# Patient Record
Sex: MALE | Race: BLACK OR AFRICAN AMERICAN | NOT HISPANIC OR LATINO | Employment: OTHER | URBAN - METROPOLITAN AREA
[De-identification: names, ages, dates, MRNs, and addresses within clinical notes are randomized per-mention and may not be internally consistent; named-entity substitution may affect disease eponyms.]

---

## 2017-01-03 ENCOUNTER — HOSPITAL ENCOUNTER (EMERGENCY)
Facility: OTHER | Age: 32
Discharge: HOME | End: 2017-01-03
Attending: EMERGENCY MEDICINE
Payer: MEDICAID

## 2017-01-03 VITALS
HEART RATE: 78 BPM | OXYGEN SATURATION: 99 % | TEMPERATURE: 98 F | DIASTOLIC BLOOD PRESSURE: 67 MMHG | HEIGHT: 65 IN | SYSTOLIC BLOOD PRESSURE: 140 MMHG | WEIGHT: 145 LBS | BODY MASS INDEX: 24.16 KG/M2 | RESPIRATION RATE: 20 BRPM

## 2017-01-03 DIAGNOSIS — R11.2 NAUSEA VOMITING AND DIARRHEA: ICD-10-CM

## 2017-01-03 DIAGNOSIS — R19.7 NAUSEA VOMITING AND DIARRHEA: ICD-10-CM

## 2017-01-03 DIAGNOSIS — K51.00 PANCOLITIS: Primary | ICD-10-CM

## 2017-01-03 LAB
ALBUMIN SERPL BCP-MCNC: 5.2 G/DL
ALP SERPL-CCNC: 101 U/L
ALT SERPL W/O P-5'-P-CCNC: 18 U/L
ANION GAP SERPL CALC-SCNC: 19 MMOL/L
AST SERPL-CCNC: 21 U/L
BASOPHILS # BLD AUTO: 0.03 K/UL
BASOPHILS NFR BLD: 0.2 %
BILIRUB SERPL-MCNC: 0.8 MG/DL
BUN SERPL-MCNC: 19 MG/DL
CALCIUM SERPL-MCNC: 11.1 MG/DL
CHLORIDE SERPL-SCNC: 109 MMOL/L
CO2 SERPL-SCNC: 17 MMOL/L
CREAT SERPL-MCNC: 1.4 MG/DL
DIFFERENTIAL METHOD: ABNORMAL
EOSINOPHIL # BLD AUTO: 0 K/UL
EOSINOPHIL NFR BLD: 0 %
ERYTHROCYTE [DISTWIDTH] IN BLOOD BY AUTOMATED COUNT: 12.7 %
EST. GFR  (AFRICAN AMERICAN): >60 ML/MIN/1.73 M^2
EST. GFR  (NON AFRICAN AMERICAN): >60 ML/MIN/1.73 M^2
GLUCOSE SERPL-MCNC: 130 MG/DL
HCT VFR BLD AUTO: 46.3 %
HGB BLD-MCNC: 16.7 G/DL
LIPASE SERPL-CCNC: 15 U/L
LYMPHOCYTES # BLD AUTO: 1.9 K/UL
LYMPHOCYTES NFR BLD: 10 %
MCH RBC QN AUTO: 33.2 PG
MCHC RBC AUTO-ENTMCNC: 36.1 %
MCV RBC AUTO: 92 FL
MONOCYTES # BLD AUTO: 1.2 K/UL
MONOCYTES NFR BLD: 6.3 %
NEUTROPHILS # BLD AUTO: 15.8 K/UL
NEUTROPHILS NFR BLD: 83.2 %
PLATELET # BLD AUTO: 283 K/UL
PMV BLD AUTO: 10.4 FL
POTASSIUM SERPL-SCNC: 3.9 MMOL/L
PROT SERPL-MCNC: 10.1 G/DL
RBC # BLD AUTO: 5.03 M/UL
SODIUM SERPL-SCNC: 145 MMOL/L
WBC # BLD AUTO: 18.96 K/UL

## 2017-01-03 PROCEDURE — 83690 ASSAY OF LIPASE: CPT

## 2017-01-03 PROCEDURE — 63600175 PHARM REV CODE 636 W HCPCS: Performed by: PHYSICIAN ASSISTANT

## 2017-01-03 PROCEDURE — 25500020 PHARM REV CODE 255: Performed by: EMERGENCY MEDICINE

## 2017-01-03 PROCEDURE — 85025 COMPLETE CBC W/AUTO DIFF WBC: CPT

## 2017-01-03 PROCEDURE — 96375 TX/PRO/DX INJ NEW DRUG ADDON: CPT

## 2017-01-03 PROCEDURE — 25000003 PHARM REV CODE 250: Performed by: PHYSICIAN ASSISTANT

## 2017-01-03 PROCEDURE — 96365 THER/PROPH/DIAG IV INF INIT: CPT

## 2017-01-03 PROCEDURE — 99284 EMERGENCY DEPT VISIT MOD MDM: CPT | Mod: 25

## 2017-01-03 PROCEDURE — 96376 TX/PRO/DX INJ SAME DRUG ADON: CPT

## 2017-01-03 PROCEDURE — 96361 HYDRATE IV INFUSION ADD-ON: CPT

## 2017-01-03 PROCEDURE — 80053 COMPREHEN METABOLIC PANEL: CPT

## 2017-01-03 RX ORDER — HYDROMORPHONE HYDROCHLORIDE 1 MG/ML
0.25 INJECTION, SOLUTION INTRAMUSCULAR; INTRAVENOUS; SUBCUTANEOUS
Status: COMPLETED | OUTPATIENT
Start: 2017-01-03 | End: 2017-01-03

## 2017-01-03 RX ORDER — HYDROMORPHONE HYDROCHLORIDE 1 MG/ML
1 INJECTION, SOLUTION INTRAMUSCULAR; INTRAVENOUS; SUBCUTANEOUS
Status: COMPLETED | OUTPATIENT
Start: 2017-01-03 | End: 2017-01-03

## 2017-01-03 RX ORDER — SODIUM CHLORIDE 9 MG/ML
1000 INJECTION, SOLUTION INTRAVENOUS
Status: COMPLETED | OUTPATIENT
Start: 2017-01-03 | End: 2017-01-03

## 2017-01-03 RX ORDER — MORPHINE SULFATE 2 MG/ML
4 INJECTION, SOLUTION INTRAMUSCULAR; INTRAVENOUS
Status: DISCONTINUED | OUTPATIENT
Start: 2017-01-03 | End: 2017-01-03

## 2017-01-03 RX ORDER — OXYCODONE AND ACETAMINOPHEN 5; 325 MG/1; MG/1
1 TABLET ORAL EVERY 4 HOURS PRN
Qty: 10 TABLET | Refills: 0 | Status: SHIPPED | OUTPATIENT
Start: 2017-01-03 | End: 2017-01-26

## 2017-01-03 RX ORDER — PROMETHAZINE HYDROCHLORIDE 25 MG/1
25 TABLET ORAL EVERY 4 HOURS
Qty: 20 TABLET | Refills: 0 | Status: SHIPPED | OUTPATIENT
Start: 2017-01-03 | End: 2017-01-26

## 2017-01-03 RX ORDER — FAMOTIDINE 10 MG/ML
20 INJECTION INTRAVENOUS
Status: COMPLETED | OUTPATIENT
Start: 2017-01-03 | End: 2017-01-03

## 2017-01-03 RX ORDER — ONDANSETRON 2 MG/ML
4 INJECTION INTRAMUSCULAR; INTRAVENOUS
Status: COMPLETED | OUTPATIENT
Start: 2017-01-03 | End: 2017-01-03

## 2017-01-03 RX ORDER — ONDANSETRON 4 MG/1
4 TABLET, ORALLY DISINTEGRATING ORAL EVERY 8 HOURS PRN
Qty: 12 TABLET | Refills: 0 | Status: SHIPPED | OUTPATIENT
Start: 2017-01-03

## 2017-01-03 RX ADMIN — HYDROMORPHONE HYDROCHLORIDE 1 MG: 1 INJECTION, SOLUTION INTRAMUSCULAR; INTRAVENOUS; SUBCUTANEOUS at 07:01

## 2017-01-03 RX ADMIN — ONDANSETRON HYDROCHLORIDE 4 MG: 2 SOLUTION INTRAMUSCULAR; INTRAVENOUS at 06:01

## 2017-01-03 RX ADMIN — PROMETHAZINE HYDROCHLORIDE 12.5 MG: 25 INJECTION INTRAMUSCULAR; INTRAVENOUS at 08:01

## 2017-01-03 RX ADMIN — FAMOTIDINE 20 MG: 10 INJECTION, SOLUTION INTRAVENOUS at 06:01

## 2017-01-03 RX ADMIN — SODIUM CHLORIDE 1000 ML: 0.9 INJECTION, SOLUTION INTRAVENOUS at 08:01

## 2017-01-03 RX ADMIN — HYDROMORPHONE HYDROCHLORIDE 0.25 MG: 1 INJECTION, SOLUTION INTRAMUSCULAR; INTRAVENOUS; SUBCUTANEOUS at 09:01

## 2017-01-03 RX ADMIN — SODIUM CHLORIDE 1000 ML: 0.9 INJECTION, SOLUTION INTRAVENOUS at 06:01

## 2017-01-03 RX ADMIN — HYDROMORPHONE HYDROCHLORIDE 1 MG: 1 INJECTION, SOLUTION INTRAMUSCULAR; INTRAVENOUS; SUBCUTANEOUS at 08:01

## 2017-01-03 RX ADMIN — IOHEXOL 75 ML: 350 INJECTION, SOLUTION INTRAVENOUS at 07:01

## 2017-01-03 NOTE — ED PROVIDER NOTES
Encounter Date: 1/3/2017       History     Chief Complaint   Patient presents with    Emesis     pt c/o n/v/d that started sunday, pt is unable to keep anything down. pt also c/o abd pain. reports chronic h/o gastritis.      Review of patient's allergies indicates:  No Known Allergies  HPI Comments: Patient is 31-year-old male history of gastritis and acute kidney injury or presents with complaints of generalized abdominal pain, nausea, vomiting, diarrhea that started approximately 2 days prior to arrival.  He reports this episode is similar to previous episodes of gastritis.  He reports this episode started after eating pizza 3 days ago.  Since, fever, chills, chest pain, shortness of breath.  He is planning to follow up with gastroenterology this month but has been unable to be evaluated by them.  He is currently accompanied by his female significant other who is at bedside.    The history is provided by the patient.     No past medical history on file.  Past Medical History Pertinent Negatives   Diagnosis Date Noted    Diabetes mellitus 5/7/2013    Hypertension 5/7/2013     Past Surgical History   Procedure Laterality Date    None       No family history on file.  Social History   Substance Use Topics    Smoking status: Never Smoker    Smokeless tobacco: Not on file    Alcohol use No     Review of Systems   Constitutional: Negative for chills and fever.   HENT: Negative for sore throat and trouble swallowing.    Eyes: Negative for visual disturbance.   Respiratory: Negative for cough and shortness of breath.    Cardiovascular: Negative for chest pain.   Gastrointestinal: Positive for abdominal pain, diarrhea, nausea and vomiting. Negative for constipation.   Genitourinary: Negative for dysuria and flank pain.   Musculoskeletal: Negative for back pain, neck pain and neck stiffness.   Skin: Negative for rash.   Neurological: Negative for dizziness, syncope, weakness and headaches.   Psychiatric/Behavioral:  Negative for confusion.       Physical Exam   Initial Vitals   BP Pulse Resp Temp SpO2   01/03/17 1611 01/03/17 1611 01/03/17 1611 01/03/17 1611 01/03/17 1611   123/84 101 20 98.2 °F (36.8 °C) 97 %     Physical Exam    Nursing note and vitals reviewed.  Constitutional: He appears well-developed and well-nourished. He is not diaphoretic. No distress.   The appearing -American male laying and prone position on exam table.  He is able to transition however he does so very slowly.  When he sits in upright position he rocks back and forth in pain.  There is no vomiting during interview and exam.  He makes good eye contact, speaks in short sentences.  Is cooperative.   HENT:   Head: Normocephalic and atraumatic.   Dry oral mucous membranes   Eyes: EOM are normal. Pupils are equal, round, and reactive to light.   Neck: Normal range of motion. Neck supple.   Cardiovascular: Normal rate, regular rhythm and normal heart sounds. Exam reveals no gallop and no friction rub.    No murmur heard.  Pulmonary/Chest: Breath sounds normal. He has no wheezes. He has no rhonchi. He has no rales.   Abdominal: Soft. Bowel sounds are normal. He exhibits no distension. There is tenderness. There is no rebound and no guarding.   Generalized abdominal tenderness over entire abdomen with hypoactive bowel sounds.  No CVA tenderness to percussion   Musculoskeletal: Normal range of motion. He exhibits no edema or tenderness.   Lymphadenopathy:     He has no cervical adenopathy.   Neurological: He is alert and oriented to person, place, and time. He has normal strength. No sensory deficit.   Skin: Skin is warm and dry. No rash and no abscess noted. No erythema.   Psychiatric: He has a normal mood and affect. His behavior is normal. Thought content normal.         ED Course   Procedures  Labs Reviewed   CBC W/ AUTO DIFFERENTIAL   COMPREHENSIVE METABOLIC PANEL   LIPASE         Labs Reviewed   CBC W/ AUTO DIFFERENTIAL - Abnormal; Notable for  the following:        Result Value    WBC 18.96 (*)     MCH 33.2 (*)     MCHC 36.1 (*)     Gran # 15.8 (*)     Mono # 1.2 (*)     Gran% 83.2 (*)     Lymph% 10.0 (*)     All other components within normal limits   COMPREHENSIVE METABOLIC PANEL - Abnormal; Notable for the following:     CO2 17 (*)     Glucose 130 (*)     Calcium 11.1 (*)     Total Protein 10.1 (*)     Anion Gap 19 (*)     All other components within normal limits   LIPASE     Imaging Results         CT Abdomen Pelvis With Contrast (Final result)    Abnormal Result time:  01/03/17 20:38:49    Final result by Jaskaran Morales MD (01/03/17 20:38:49)    Impression:       Wall thickening and hyperemia extending from the cecum to the rectum, suggestive of pancolitis.  Small linear attenuation material within the lumen of the cecum may represent either enteric contrast material or hemorrhage.  Clinical correlation is advised.    Indeterminate 1.6 cm low-attenuation lesion in the lower pole of the right kidney.  The findings may represent a hemorrhagic cyst.  Nonemergent ultrasound may be obtained for further evaluation.    Report has been flagged in the EPIC medical record system.              Electronically signed by: JASKARAN MORALES MD  Date:     01/03/17  Time:    20:38     Narrative:    Exam: [id number]  01/03/17  19:17:53 [id number] ([facility]) : CT ABDOMEN PELVIS WITH CONTRAST    Technique:    Axial CT Scan of the abdomen and pelvis was performed from the lung base to the public symphysis after the intravenous administration of  75 cc of Omni 350. Coronal and Sagittal reformats were obtained.     Comparison:     None     Findings:      The lung bases are within normal limits.  The heart is normal in appearance.  The vessels are unremarkable.  There is no evidence of lymphadenopathy in the abdomen or pelvis.    The esophagus, stomach, duodenum, and the small bowel are within normal limits.  The appendix is unremarkable.  There is wall thickening and with associated  hyperemia extending from the cecum to the rectum.  There is small curvilinear high attenuation material within the cecum.    The liver, gallbladder, and the biliary tree are within normal limits.  The spleen, pancreas, and adrenal glands are unremarkable.     There is an indeterminate 1.6 cm low-attenuation lesion in the lower pole of the right kidney.  The reminder of the kidneys are within normal limits.  The ureters and urinary bladder are within normal limits.  The prostate gland is unremarkable.    There is no evidence of free fluid.  There is no evidence of free air.    The osseous structures are within normal limits.  The abdominal wall is unremarkable.                   Medical Decision Making:   ED Management:  Urgent evaluation of 31-year-old male who presents with complaints most consistent with pancolitis with nausea and vomiting now with diarrhea.  Patient is afebrile, nontoxic appearing, hemodynamically stable.  Physical exam reveals generalized abdominal tenderness with no acute peritoneal signs.  Patient actively vomiting during interview and exam.  He does report light pink stains on toilet paper after wiping after a bowel movement.  There is no gross bleeding on exam.  CT abdomen pelvis reveals pancolitis.  Leukocytosis is noted to be 18.96.  There is mild elevation of calcium noted to be 11.1 no hyponatremia or hypokalemia.  Lipase is within normal limits.  Given 2 L of fluid, 2 rounds of antiemetics and narcotic analgesia with improvement in pain.  We'll attempt by mouth challenge.  If patient's symptoms are usually controlled plan is to discharge home with instruction or bland diet, pain management and to follow up with gastroenterology in one to 2 days for symptom recheck.                   ED Course     Clinical Impression:   The primary encounter diagnosis was Pancolitis. A diagnosis of Nausea vomiting and diarrhea was also pertinent to this visit.          Lissette Grossman PA-C  01/03/17  [id number]

## 2017-01-03 NOTE — ED AVS SNAPSHOT
[facility]  [address] [facility] [address]               Rangel Tinoco   1/3/2017  5:00 PM   ED    Description:  Male : 1985   Department:  [facility]           Your Care was Coordinated By:     Provider Role From To    Nicholas Blake MD Attending Provider 17 1703 17 [id number]    Lissette Grossman PA-C Physician Assistant 17 1703 --      Reason for Visit     Emesis           Diagnoses this Visit        Comments    Pancolitis    -  Primary     Nausea vomiting and diarrhea           ED Disposition     None           To Do List           Follow-up Information     Follow up with Thang Ybarra MD In 2 days.    Specialty:  Gastroenterology    Why:  For symptom re-check.     Contact information:    [address]  [address]  [facility] [address]  [phone]         These Medications        Disp Refills Start End    oxycodone-acetaminophen (PERCOCET) 5-325 mg per tablet 10 tablet 0 1/3/2017     Take 1 tablet by mouth every 4 (four) hours as needed for Pain. - Oral    promethazine (PHENERGAN) 25 MG tablet 20 tablet 0 1/3/2017     Take 1 tablet (25 mg total) by mouth every 4 (four) hours. - Oral    ondansetron (ZOFRAN-ODT) 4 MG TbDL 12 tablet 0 1/3/2017     Take 1 tablet (4 mg total) by mouth every 8 (eight) hours as needed. - Oral      [facility]     [facility] Care [facility] -  Assistance  Registered nurses in the [facility] provide clinical advisement, health education, appointment booking, and other advisory services.  Call for this free service at [phone].             Medications           Message regarding Medications     Verify the changes and/or additions to your medication regime listed below are the same as discussed with your clinician today.  If any of these changes or additions are incorrect, please notify your healthcare provider.        START taking these NEW  medications        Refills    oxycodone-acetaminophen (PERCOCET) 5-325 mg per tablet 0    Sig: Take 1 tablet by mouth every 4 (four) hours as needed for Pain.    Class: Print    Route: Oral    promethazine (PHENERGAN) 25 MG tablet 0    Sig: Take 1 tablet (25 mg total) by mouth every 4 (four) hours.    Class: Print    Route: Oral    ondansetron (ZOFRAN-ODT) 4 MG TbDL 0    Sig: Take 1 tablet (4 mg total) by mouth every 8 (eight) hours as needed.    Class: Print    Route: Oral      These medications were administered today        Dose Freq    0.9%  NaCl infusion 1,000 mL ED 1 Time    Sig: Inject 1,000 mLs into the vein ED 1 Time.    Class: Normal    Route: Intravenous    Cosign for Ordering: Accepted by Nicholas Blake MD on 1/3/2017  7:36 PM    ondansetron injection 4 mg 4 mg ED 1 Time    Sig: Inject 4 mg into the vein ED 1 Time.    Class: Normal    Route: Intravenous    Cosign for Ordering: Accepted by Nicholas Blake MD on 1/3/2017  7:36 PM    famotidine (PF) 20 mg/2 mL injection 20 mg 20 mg ED 1 Time    Sig: Inject 2 mLs (20 mg total) into the vein ED 1 Time.    Class: Normal    Route: Intravenous    Cosign for Ordering: Accepted by Nicholas Blake MD on 1/3/2017  7:36 PM    hydromorphone (PF) injection 1 mg 1 mg ED 1 Time    Sig: Inject 1 mL (1 mg total) into the vein ED 1 Time.    Class: Normal    Route: Intravenous    Cosign for Ordering: Accepted by Nicholas Blake MD on 1/3/2017  7:36 PM    omnipaque 350 iohexol 350 mg iodine/mL      Notes to Pharmacy: Created by cabinet override    omnipaque 350 iohexol 75 mL 75 mL IMG once as needed    Sig: Inject 75 mLs into the vein ONCE PRN for contrast.    Class: Normal    Route: Intravenous    promethazine (PHENERGAN) 12.5 mg in dextrose 5 % 50 mL IVPB 12.5 mg ED 1 Time    Sig: Inject 12.5 mg into the vein ED 1 Time.    Class: Normal    Route: Intravenous    Cosign for Ordering: Accepted by Nicholas Blake MD on 1/3/2017  8:23 PM    hydromorphone  "injection 1 mg 1 mg ED 1 Time    Sig: Inject 1 mL (1 mg total) into the vein ED 1 Time.    Class: Normal    Route: Intravenous    Cosign for Ordering: Accepted by Nicholas Blake MD on 1/3/2017  8:23 PM    0.9%  NaCl infusion 1,000 mL ED 1 Time    Sig: Inject 1,000 mLs into the vein ED 1 Time.    Class: Normal    Route: Intravenous    Cosign for Ordering: Accepted by Nicholas Blake MD on 1/3/2017  8:23 PM    hydromorphone injection 0.25 mg 0.25 mg ED 1 Time    Sig: Inject 0.25 mLs (0.25 mg total) into the vein ED 1 Time.    Class: Normal    Route: Intravenous    Cosign for Ordering: Required by Nicholas Blake MD           Verify that the below list of medications is an accurate representation of the medications you are currently taking.  If none reported, the list may be blank. If incorrect, please contact your healthcare provider. Carry this list with you in case of emergency.           Current Medications     omeprazole (PRILOSEC) 40 MG capsule Take 40 mg by mouth once daily.    hydromorphone injection 0.25 mg Inject 0.25 mLs (0.25 mg total) into the vein ED 1 Time.    omnipaque 350 iohexol 350 mg iodine/mL     ondansetron (ZOFRAN-ODT) 4 MG TbDL Take 1 tablet (4 mg total) by mouth every 8 (eight) hours as needed.    oxycodone-acetaminophen (PERCOCET) 5-325 mg per tablet Take 1 tablet by mouth every 4 (four) hours as needed for Pain.    oxycodone-acetaminophen 5-325 mg (PERCOCET) 5-325 mg per tablet Take 1 tablet by mouth every 4 (four) hours as needed for Pain.    promethazine (PHENERGAN) 25 MG tablet Take 1 tablet (25 mg total) by mouth every 4 (four) hours.    ranitidine (ZANTAC) 25 mg effervescent tablet Take 25 mg by mouth 2 (two) times daily.           Clinical Reference Information           Your Vitals Were     BP Pulse Temp Resp Height Weight    168/82 92 98.2 °F (36.8 °C) (Oral) 20 5' 5" (1.651 m) 65.8 kg (145 lb)    SpO2 BMI             96% 24.13 kg/m2         Allergies as of 1/3/2017     No " Known Allergies      Immunizations Administered on Date of Encounter - 1/3/2017     None      ED Micro, Lab, POCT     Start Ordered       Status Ordering Provider    01/03/17 1712 01/03/17 1711  Lipase  STAT      Final result     01/03/17 1711 01/03/17 1711  CBC auto differential  STAT      Final result     01/03/17 1711 01/03/17 1711  Comprehensive metabolic panel  STAT      Final result       ED Imaging Orders     Start Ordered       Status Ordering Provider    01/03/17 1840 01/03/17 1839  CT Abdomen Pelvis With Contrast  1 time imaging      Final result       Discharge References/Attachments     ULCERATIVE COLITIS (ENGLISH)    DIET, BLAND (ADULT) (ENGLISH)    DIET: SOFT, DISCHARGE INSTRUCTIONS (ENGLISH)    NAUSEA AND VOMITING, HOW TO CONTROL (ENGLISH)      [facility] Sign-Up     Activating your [facility] account is as easy as 1-2-3!     1) Visit [email or web address], select Sign Up Now, enter this activation code and your date of birth, then select Next.  [email or web address]  Expires: 2/17/2017  9:52 PM      2) Create a username and password to use when you visit [facility] in the future and select a security question in case you lose your password and select Next.    3) Enter your e-mail address and click Sign Up!    Additional Information  If you have questions, please e-mail [email or web address] or call [phone] to talk to our [facility] staff. Remember, [facility] is NOT to be used for urgent needs. For medical emergencies, dial 911.          [facility] complies with applicable Federal civil rights laws and does not discriminate on the basis of race, color, national origin, age, disability, or sex.        Language Assistance Services     ATTENTION: Language assistance services are available, free of charge. Please call [phone].      ATENCIÓN: Si habla español, tiene a escamilla disposición servicios gratuitos de asistencia lingüística. Llame al [phone].     CHÚ Ý: N?u b?n nói Ti?ng Vi?t,  có các d?ch v? h? tr? deion ng? mi?n phí dành cho b?n. G?i s? [phone].

## 2017-01-03 NOTE — ED TRIAGE NOTES
Pt reports to ED c/o N/V/D x 2 days with fevers, chills, generalized abd pain. Pt has history of gastritis. Pt is actively vomiting during assessment. Pt reports they usually give me morphine to relief my pain. AAO x 3.

## 2017-01-04 NOTE — ED NOTES
Pt observed actively vomiting.  Pt's girlfriend asking for a gown, stating pt has bloody diarrhea. Provider informed.

## 2017-01-04 NOTE — ED NOTES
Pt resting with eyes closed in recliner. VSS with monitoring in progress. Respirations even and unlabored with no s/s of distress noted. Spouse at bedside with patient. Will continue to monitor for any changes.

## 2017-01-04 NOTE — ED NOTES
Leader rounding was completed on this patient: expectations of the visit were discussed, the plan of care was addressed, and there was validation that the assigned nurse met his needs. All questions\concerns that he had were addressed and the nurse was notified.

## 2017-01-21 ENCOUNTER — HOSPITAL ENCOUNTER (INPATIENT)
Facility: OTHER | Age: 32
LOS: 2 days | Discharge: HOME | DRG: 394 | End: 2017-01-24
Attending: EMERGENCY MEDICINE | Admitting: HOSPITALIST
Payer: MEDICAID

## 2017-01-21 DIAGNOSIS — R11.15 INTRACTABLE CYCLICAL VOMITING WITH NAUSEA: ICD-10-CM

## 2017-01-21 DIAGNOSIS — K51.00 PANCOLITIS: ICD-10-CM

## 2017-01-21 DIAGNOSIS — R74.8 ELEVATED LIPASE: ICD-10-CM

## 2017-01-21 DIAGNOSIS — N18.9 ACUTE KIDNEY INJURY SUPERIMPOSED ON CHRONIC KIDNEY DISEASE: ICD-10-CM

## 2017-01-21 DIAGNOSIS — R10.84 GENERALIZED ABDOMINAL PAIN: ICD-10-CM

## 2017-01-21 DIAGNOSIS — N28.9 ACUTE RENAL INSUFFICIENCY: ICD-10-CM

## 2017-01-21 DIAGNOSIS — R11.2 INTRACTABLE VOMITING WITH NAUSEA: ICD-10-CM

## 2017-01-21 DIAGNOSIS — N17.9 ACUTE KIDNEY INJURY SUPERIMPOSED ON CHRONIC KIDNEY DISEASE: ICD-10-CM

## 2017-01-21 DIAGNOSIS — E83.52 HYPERCALCEMIA: ICD-10-CM

## 2017-01-21 DIAGNOSIS — R11.2 INTRACTABLE VOMITING WITH NAUSEA, UNSPECIFIED VOMITING TYPE: Primary | ICD-10-CM

## 2017-01-21 PROCEDURE — 99284 EMERGENCY DEPT VISIT MOD MDM: CPT

## 2017-01-21 PROCEDURE — 11000001 HC ACUTE MED/SURG PRIVATE ROOM

## 2017-01-21 PROCEDURE — 96375 TX/PRO/DX INJ NEW DRUG ADDON: CPT

## 2017-01-21 PROCEDURE — 96361 HYDRATE IV INFUSION ADD-ON: CPT

## 2017-01-21 PROCEDURE — 96374 THER/PROPH/DIAG INJ IV PUSH: CPT

## 2017-01-21 NOTE — IP AVS SNAPSHOT
[facility] Location ([place])  [address] [place] [id number]  Phone: [phone]           Patient Discharge Instructions     Our goal is to set you up for success. This packet includes information on your condition, medications, and your home care. It will help you to care for yourself so you don't get sicker and need to go back to the hospital.     Please ask your nurse if you have any questions.        There are many details to remember when preparing to leave the hospital. Here is what you will need to do:    1. Take your medicine. If you are prescribed medications, review your Medication List in the following pages. You may have new medications to  at the pharmacy and others that you'll need to stop taking. Review the instructions for how and when to take your medications. Talk with your doctor or nurses if you are unsure of what to do.     2. Go to your follow-up appointments. Specific follow-up information is listed in the following pages. Your may be contacted by a transition nurse or clinical provider about future appointments. Be sure we have all of the phone numbers to reach you, if needed. Please contact your provider's office if you are unable to make an appointment.     3. Watch for warning signs. Your doctor or nurse will give you detailed warning signs to watch for and when to call for assistance. These instructions may also include educational information about your condition. If you experience any of warning signs to your health, call your doctor.               ** Verify the list of medication(s) below is accurate and up to date. Carry this with you in case of emergency. If your medications have changed, please notify your healthcare provider.             Medication List      START taking these medications        Additional Info                      mesalamine 500 MG CR capsule   Commonly known as:  PENTASA   Quantity:  540 capsule   Refills:  1   Dose:  1000 mg     Instructions:  Take 2 capsules (1,000 mg total) by mouth 3 (three) times daily.     Begin Date    AM    Noon    PM    Bedtime         CHANGE how you take these medications        Additional Info                      oxycodone-acetaminophen 5-325 mg per tablet   Commonly known as:  PERCOCET   Quantity:  10 tablet   Refills:  0   Dose:  1 tablet   What changed:  Another medication with the same name was removed. Continue taking this medication, and follow the directions you see here.    Instructions:  Take 1 tablet by mouth every 4 (four) hours as needed for Pain.     Begin Date    AM    Noon    PM    Bedtime         CONTINUE taking these medications        Additional Info                      omeprazole 40 MG capsule   Commonly known as:  PRILOSEC   Refills:  0   Dose:  40 mg    Instructions:  Take 40 mg by mouth once daily.     Begin Date    AM    Noon    PM    Bedtime       ondansetron 4 MG Tbdl   Commonly known as:  ZOFRAN-ODT   Quantity:  12 tablet   Refills:  0   Dose:  4 mg    Instructions:  Take 1 tablet (4 mg total) by mouth every 8 (eight) hours as needed.     Begin Date    AM    Noon    PM    Bedtime       promethazine 25 MG tablet   Commonly known as:  PHENERGAN   Quantity:  20 tablet   Refills:  0   Dose:  25 mg    Instructions:  Take 1 tablet (25 mg total) by mouth every 4 (four) hours.     Begin Date    AM    Noon    PM    Bedtime       ranitidine 25 mg effervescent tablet   Commonly known as:  ZANTAC   Refills:  0   Dose:  25 mg    Instructions:  Take 25 mg by mouth 2 (two) times daily.     Begin Date    AM    Noon    PM    Bedtime            Where to Get Your Medications      You can get these medications from any pharmacy     Bring a paper prescription for each of these medications     mesalamine 500 MG CR capsule                  Please bring to all follow up appointments:    1. A copy of your discharge instructions.  2. All medicines you are currently taking in their original  "bottles.  3. Identification and insurance card.    Please arrive 15 minutes ahead of scheduled appointment time.    Please call 24 hours in advance if you must reschedule your appointment and/or time.        Follow-up Information     Follow up with [facility].    Why:  See new PCP in 1 week    Contact information:    [address] [facility] [address]  [phone]          Follow up with Beryl Jordan MD In 2 weeks.    Specialty:  Gastroenterology    Contact information:    [address]  [address] [address]  [phone]          Discharge Instructions     Future Orders    Activity as tolerated     Call MD for:  difficulty breathing or increased cough     Call MD for:  increased confusion or weakness     Call MD for:  severe persistent headache     Call MD for:  severe uncontrolled pain     Call MD for:  temperature >100.4     Diet general     Questions:    Total calories:      Fat restriction, if any:      Protein restriction, if any:      Na restriction, if any:      Fluid restriction:      Additional restrictions:      No dressing needed         Primary Diagnosis     Your primary diagnosis was:  Intractable Vomiting With Nausea      Admission Information     Date & Time Provider Department [facility]    1/21/2017 11:16 PM Brian Shetty MD [facility] [id number]      Care Providers     Provider Role Specialty Primary office phone    Brian Shetty MD Attending Provider Hospitalist [phone]    Dm Jenkins MD Consulting Physician  Gastroenterology [phone]    Beryl Jordan MD Surgeon  Gastroenterology [phone]      Your Vitals Were     BP Pulse Temp Resp Height Weight    142/81 (BP Location: Right arm, Patient Position: Lying, BP Method: Automatic) 75 98.8 °F (37.1 °C) (Oral) 16 5' 5" (1.651 m) 65.8 kg (145 lb)    SpO2 BMI             100% 24.13 kg/m2         Recent Lab Values     No lab values to display.      Pending Labs     " Order Current Status    Specimen to Pathology - Surgery In process      Allergies as of 1/24/2017     No Known Allergies      [facility]     [facility] Nurse Care Line - 24/7 Assistance  Unless otherwise directed by your provider, please contact [facility], our nurse care line that is available for 24/7 assistance.     Registered nurses in the [facility] provide clinical advisement, health education, appointment booking, and other advisory services.  Call for this free service at [phone].        Advance Directives     An advance directive is a document which, in the event you are no longer able to make decisions for yourself, tells your healthcare team what kind of treatment you do or do not want to receive, or who you would like to make those decisions for you.  If you do not currently have an advance directive, [facility] encourages you to create one.  For more information call:  [phone] ([phone]), [phone] ([phone]),  or log on to [email or web address].        Language Assistance Services     ATTENTION: Language assistance services are available, free of charge. Please call [phone].      ATENCIÓN: Si habla español, tiene a escamilla disposición servicios gratuitos de asistencia lingüística. Llame al [phone].     CHÚ Ý: N?u b?n nói Ti?ng Vi?t, có các d?ch v? h? tr? ngôn ng? mi?n phí dành cho b?n. G?i s? [phone].        Chronic Kindey Disease Education             [facility] Sign-Up     Activating your [facility] account is as easy as 1-2-3!     1) Visit [email or web address], select Sign Up Now, enter this activation code and your date of birth, then select Next.  [email or web address]  Expires: 2/17/2017  9:52 PM      2) Create a username and password to use when you visit [facility] in the future and select a security question in case you lose your password and select Next.    3) Enter your e-mail address and click Sign Up!    Additional Information  If you  have questions, please e-mail [email or web address] or call [phone] to talk to our [facility] staff. Remember, [facility] is NOT to be used for urgent needs. For medical emergencies, dial 911.          [facility] complies with applicable Federal civil rights laws and does not discriminate on the basis of race, color, national origin, age, disability, or sex.

## 2017-01-22 PROBLEM — E86.0 SEVERE DEHYDRATION: Status: ACTIVE | Noted: 2017-01-22

## 2017-01-22 PROBLEM — N18.9 ACUTE KIDNEY INJURY SUPERIMPOSED ON CHRONIC KIDNEY DISEASE: Status: ACTIVE | Noted: 2017-01-22

## 2017-01-22 PROBLEM — R74.8 ELEVATED LIPASE: Status: ACTIVE | Noted: 2017-01-22

## 2017-01-22 PROBLEM — K51.00 PANCOLITIS: Status: ACTIVE | Noted: 2017-01-22

## 2017-01-22 PROBLEM — A09 DIARRHEA OF INFECTIOUS ORIGIN: Status: ACTIVE | Noted: 2017-01-22

## 2017-01-22 PROBLEM — E83.52 HYPERCALCEMIA: Status: RESOLVED | Noted: 2017-01-22 | Resolved: 2017-01-22

## 2017-01-22 PROBLEM — N17.9 ACUTE KIDNEY INJURY SUPERIMPOSED ON CHRONIC KIDNEY DISEASE: Status: ACTIVE | Noted: 2017-01-22

## 2017-01-22 PROBLEM — R10.84 GENERALIZED ABDOMINAL PAIN: Status: ACTIVE | Noted: 2017-01-22

## 2017-01-22 PROBLEM — E83.52 HYPERCALCEMIA: Status: ACTIVE | Noted: 2017-01-22

## 2017-01-22 PROBLEM — R11.2 INTRACTABLE VOMITING WITH NAUSEA: Status: ACTIVE | Noted: 2017-01-22

## 2017-01-22 PROBLEM — N28.9 ACUTE RENAL INSUFFICIENCY: Status: ACTIVE | Noted: 2017-01-22

## 2017-01-22 LAB
ALBUMIN SERPL BCP-MCNC: 5.3 G/DL
ALP SERPL-CCNC: 104 U/L
ALT SERPL W/O P-5'-P-CCNC: 21 U/L
ANION GAP SERPL CALC-SCNC: 23 MMOL/L
ANION GAP SERPL CALC-SCNC: 7 MMOL/L
AST SERPL-CCNC: 22 U/L
BASOPHILS # BLD AUTO: 0.03 K/UL
BASOPHILS NFR BLD: 0.2 %
BILIRUB SERPL-MCNC: 1.3 MG/DL
BUN SERPL-MCNC: 41 MG/DL
BUN SERPL-MCNC: 44 MG/DL
CALCIUM SERPL-MCNC: 11.2 MG/DL
CALCIUM SERPL-MCNC: 8 MG/DL
CHLORIDE SERPL-SCNC: 104 MMOL/L
CHLORIDE SERPL-SCNC: 91 MMOL/L
CO2 SERPL-SCNC: 24 MMOL/L
CO2 SERPL-SCNC: 25 MMOL/L
CREAT SERPL-MCNC: 1.9 MG/DL
CREAT SERPL-MCNC: 3.7 MG/DL
DIFFERENTIAL METHOD: ABNORMAL
EOSINOPHIL # BLD AUTO: 0 K/UL
EOSINOPHIL NFR BLD: 0 %
ERYTHROCYTE [DISTWIDTH] IN BLOOD BY AUTOMATED COUNT: 11.9 %
EST. GFR  (AFRICAN AMERICAN): 24 ML/MIN/1.73 M^2
EST. GFR  (AFRICAN AMERICAN): 53 ML/MIN/1.73 M^2
EST. GFR  (NON AFRICAN AMERICAN): 21 ML/MIN/1.73 M^2
EST. GFR  (NON AFRICAN AMERICAN): 46 ML/MIN/1.73 M^2
GLUCOSE SERPL-MCNC: 133 MG/DL
GLUCOSE SERPL-MCNC: 89 MG/DL
HCT VFR BLD AUTO: 49.1 %
HGB BLD-MCNC: 18 G/DL
LIPASE SERPL-CCNC: 78 U/L
LYMPHOCYTES # BLD AUTO: 2.2 K/UL
LYMPHOCYTES NFR BLD: 17.3 %
MCH RBC QN AUTO: 33.1 PG
MCHC RBC AUTO-ENTMCNC: 36.7 %
MCV RBC AUTO: 90 FL
MONOCYTES # BLD AUTO: 1.5 K/UL
MONOCYTES NFR BLD: 11.6 %
NEUTROPHILS # BLD AUTO: 9.1 K/UL
NEUTROPHILS NFR BLD: 70.7 %
PLATELET # BLD AUTO: 256 K/UL
PMV BLD AUTO: 10.4 FL
POTASSIUM SERPL-SCNC: 3.9 MMOL/L
POTASSIUM SERPL-SCNC: 4 MMOL/L
PROT SERPL-MCNC: 10.7 G/DL
RBC # BLD AUTO: 5.44 M/UL
SODIUM SERPL-SCNC: 136 MMOL/L
SODIUM SERPL-SCNC: 138 MMOL/L
WBC # BLD AUTO: 12.88 K/UL

## 2017-01-22 PROCEDURE — 99223 1ST HOSP IP/OBS HIGH 75: CPT | Mod: ,,, | Performed by: HOSPITALIST

## 2017-01-22 PROCEDURE — 11000001 HC ACUTE MED/SURG PRIVATE ROOM

## 2017-01-22 PROCEDURE — 25000003 PHARM REV CODE 250: Performed by: EMERGENCY MEDICINE

## 2017-01-22 PROCEDURE — 80048 BASIC METABOLIC PNL TOTAL CA: CPT

## 2017-01-22 PROCEDURE — 99220 PR INITIAL OBSERVATION CARE,LEVL III: CPT | Mod: ,,, | Performed by: FAMILY MEDICINE

## 2017-01-22 PROCEDURE — 80053 COMPREHEN METABOLIC PANEL: CPT

## 2017-01-22 PROCEDURE — 85025 COMPLETE CBC W/AUTO DIFF WBC: CPT

## 2017-01-22 PROCEDURE — 63600175 PHARM REV CODE 636 W HCPCS: Performed by: EMERGENCY MEDICINE

## 2017-01-22 PROCEDURE — 83690 ASSAY OF LIPASE: CPT

## 2017-01-22 PROCEDURE — 36415 COLL VENOUS BLD VENIPUNCTURE: CPT

## 2017-01-22 PROCEDURE — 63600175 PHARM REV CODE 636 W HCPCS: Performed by: FAMILY MEDICINE

## 2017-01-22 PROCEDURE — 63600175 PHARM REV CODE 636 W HCPCS: Performed by: INTERNAL MEDICINE

## 2017-01-22 PROCEDURE — 25000003 PHARM REV CODE 250: Performed by: FAMILY MEDICINE

## 2017-01-22 RX ORDER — ONDANSETRON HYDROCHLORIDE 4 MG/5ML
4 SOLUTION ORAL EVERY 8 HOURS PRN
Status: DISCONTINUED | OUTPATIENT
Start: 2017-01-22 | End: 2017-01-24 | Stop reason: HOSPADM

## 2017-01-22 RX ORDER — MORPHINE SULFATE 2 MG/ML
2 INJECTION, SOLUTION INTRAMUSCULAR; INTRAVENOUS EVERY 6 HOURS PRN
Status: DISCONTINUED | OUTPATIENT
Start: 2017-01-22 | End: 2017-01-24 | Stop reason: HOSPADM

## 2017-01-22 RX ORDER — MORPHINE SULFATE 2 MG/ML
INJECTION, SOLUTION INTRAMUSCULAR; INTRAVENOUS
Status: DISPENSED
Start: 2017-01-22 | End: 2017-01-22

## 2017-01-22 RX ORDER — KETOROLAC TROMETHAMINE 30 MG/ML
15 INJECTION, SOLUTION INTRAMUSCULAR; INTRAVENOUS
Status: COMPLETED | OUTPATIENT
Start: 2017-01-22 | End: 2017-01-22

## 2017-01-22 RX ORDER — ONDANSETRON 2 MG/ML
4 INJECTION INTRAMUSCULAR; INTRAVENOUS
Status: COMPLETED | OUTPATIENT
Start: 2017-01-22 | End: 2017-01-22

## 2017-01-22 RX ORDER — FAMOTIDINE 10 MG/ML
20 INJECTION INTRAVENOUS
Status: COMPLETED | OUTPATIENT
Start: 2017-01-22 | End: 2017-01-22

## 2017-01-22 RX ORDER — MORPHINE SULFATE 2 MG/ML
2 INJECTION, SOLUTION INTRAMUSCULAR; INTRAVENOUS EVERY 6 HOURS PRN
Status: COMPLETED | OUTPATIENT
Start: 2017-01-22 | End: 2017-01-22

## 2017-01-22 RX ORDER — SODIUM CHLORIDE 9 MG/ML
INJECTION, SOLUTION INTRAVENOUS CONTINUOUS
Status: DISCONTINUED | OUTPATIENT
Start: 2017-01-22 | End: 2017-01-23

## 2017-01-22 RX ORDER — DIPHENOXYLATE HYDROCHLORIDE AND ATROPINE SULFATE 2.5; .025 MG/1; MG/1
1 TABLET ORAL 4 TIMES DAILY PRN
Status: DISCONTINUED | OUTPATIENT
Start: 2017-01-22 | End: 2017-01-24 | Stop reason: HOSPADM

## 2017-01-22 RX ADMIN — FAMOTIDINE 20 MG: 10 INJECTION INTRAVENOUS at 12:01

## 2017-01-22 RX ADMIN — MORPHINE SULFATE 2 MG: 2 INJECTION, SOLUTION INTRAMUSCULAR; INTRAVENOUS at 02:01

## 2017-01-22 RX ADMIN — ONDANSETRON 4 MG: 2 INJECTION, SOLUTION INTRAMUSCULAR; INTRAVENOUS at 12:01

## 2017-01-22 RX ADMIN — SODIUM CHLORIDE 1000 ML: 0.9 INJECTION, SOLUTION INTRAVENOUS at 12:01

## 2017-01-22 RX ADMIN — MORPHINE SULFATE 2 MG: 2 INJECTION, SOLUTION INTRAMUSCULAR; INTRAVENOUS at 08:01

## 2017-01-22 RX ADMIN — ALUMINUM HYDROXIDE, MAGNESIUM HYDROXIDE, SIMETHICONE: 400; 400; 40 SUSPENSION ORAL at 12:01

## 2017-01-22 RX ADMIN — SODIUM CHLORIDE: 0.9 INJECTION, SOLUTION INTRAVENOUS at 11:01

## 2017-01-22 RX ADMIN — SODIUM CHLORIDE 1000 ML: 0.9 INJECTION, SOLUTION INTRAVENOUS at 01:01

## 2017-01-22 RX ADMIN — SODIUM CHLORIDE: 0.9 INJECTION, SOLUTION INTRAVENOUS at 06:01

## 2017-01-22 RX ADMIN — MORPHINE SULFATE 2 MG: 2 INJECTION, SOLUTION INTRAMUSCULAR; INTRAVENOUS at 09:01

## 2017-01-22 RX ADMIN — KETOROLAC TROMETHAMINE 15 MG: 30 INJECTION, SOLUTION INTRAMUSCULAR at 12:01

## 2017-01-22 NOTE — ED NOTES
"Pt states "I'm still in pain, can you tell the doctor that?" RN states that she will relay message to doctor. Pt states "Am I making you nervous?" and began thrusting hips in air and putting his hand down his pants.   "

## 2017-01-22 NOTE — ED PROVIDER NOTES
"Encounter Date: 1/21/2017    SCRIBE #1 NOTE: I, Katie Luke, am scribing for, and in the presence of,  Dr. Marlow. I have scribed the entire note.       History     Chief Complaint   Patient presents with    Abdominal Pain     with vomiting since Tuesday, hot and cold and can't keep anything down     Review of patient's allergies indicates:  No Known Allergies  HPI Comments: Time seen by provider: 12:00 AM    This is a 31 y.o. male with h/o gastritis and acid reflux who presents with complaint of generalized abdominal pain that began 4 days ago. He notes associated emesis, nausea, and diarrhea. He reports the pain feels like "something is stuck in my throat." He reports he is unable to tolerate any food or drink. He reports noncompliance with taking Prilosec. He states he occasionally takes Zantac. He reports GI appointment in 2 weeks. He admits to smoking marijuana occasionally, but has not smoked for 2 weeks. He denies EtOH use. He denies any allergies.     The history is provided by the patient.     History reviewed. No pertinent past medical history.  Past Medical History Pertinent Negatives   Diagnosis Date Noted    Diabetes mellitus 5/7/2013    Hypertension 5/7/2013     Past Surgical History   Procedure Laterality Date    None       History reviewed. No pertinent family history.  Social History   Substance Use Topics    Smoking status: Never Smoker    Smokeless tobacco: None    Alcohol use No     Review of Systems   Constitutional: Negative for diaphoresis and fever.   HENT: Negative for congestion and sore throat.    Eyes: Negative for pain.   Respiratory: Negative for cough and shortness of breath.    Cardiovascular: Negative for chest pain and leg swelling.   Gastrointestinal: Positive for abdominal pain, diarrhea, nausea and vomiting. Negative for constipation.   Genitourinary: Negative for dysuria and hematuria.   Musculoskeletal: Negative for myalgias.   Skin: Negative for color change and rash. "   Neurological: Negative for syncope and headaches.   Psychiatric/Behavioral: Negative for behavioral problems and confusion.       Physical Exam   Initial Vitals   BP Pulse Resp Temp SpO2   01/21/17 2310 01/21/17 2310 01/21/17 2310 01/21/17 2310 01/21/17 2310   115/65 118 18 97.9 °F (36.6 °C) 99 %     Physical Exam    Nursing note and vitals reviewed.  Constitutional: He appears well-developed and well-nourished. He is not diaphoretic. No distress.   HENT:   Head: Normocephalic and atraumatic.   Oropharynx is clear and intact.  Moist mucous membranes.   Eyes: EOM are normal. Pupils are equal, round, and reactive to light. Right eye exhibits no discharge. Left eye exhibits no discharge.   Conjunctiva are pink, clear, and intact.   Neck: Normal range of motion. Neck supple. No JVD present.   Cardiovascular: Normal rate, regular rhythm, S1 normal, S2 normal and normal heart sounds. Exam reveals no gallop and no friction rub.    No murmur heard.  Normal S1, S2. No murmurs, no rubs, no gallops.    Pulmonary/Chest: Breath sounds normal. No respiratory distress. He has no wheezes. He has no rhonchi. He has no rales.   Clear to ascultation bilaterally.   Abdominal: Soft. Bowel sounds are normal. He exhibits no distension. There is no tenderness. There is no rebound and no guarding.   No audible bruits.   Musculoskeletal: Normal range of motion. He exhibits no edema or tenderness.   No lower extremity edema.    Neurological: He is alert and oriented to person, place, and time. He has normal strength. No sensory deficit.   Skin: Skin is warm and dry. No rash and no abscess noted. No erythema. No pallor.   Warm and dry. No rashes, no lesions, or skin tenting.   Psychiatric: He has a normal mood and affect. His behavior is normal. Judgment and thought content normal.         ED Course   Procedures  Labs Reviewed   CBC W/ AUTO DIFFERENTIAL - Abnormal; Notable for the following:        Result Value    WBC 12.88 (*)     MCH 33.1  (*)     MCHC 36.7 (*)     Gran # 9.1 (*)     Mono # 1.5 (*)     Lymph% 17.3 (*)     All other components within normal limits   COMPREHENSIVE METABOLIC PANEL - Abnormal; Notable for the following:     Chloride 91 (*)     Glucose 133 (*)     BUN, Bld 44 (*)     Creatinine 3.7 (*)     Calcium 11.2 (*)     Total Protein 10.7 (*)     Albumin 5.3 (*)     Total Bilirubin 1.3 (*)     Anion Gap 23 (*)     eGFR if  24 (*)     eGFR if non  21 (*)     All other components within normal limits   LIPASE - Abnormal; Notable for the following:     Lipase 78 (*)     All other components within normal limits      Imaging Results     None                  Medical Decision Making:   History:   Old Medical Records: I decided to obtain old medical records.  Clinical Tests:   Lab Tests: Ordered and Reviewed  ED Management:  12:02 AM On review of charts patient had an CT abd/pelvis with contrast January 3rd that resulted normal.             Scribe Attestation:   Scribe #1: I performed the above scribed service and the documentation accurately describes the services I performed. I attest to the accuracy of the note.    Attending Attestation:           Physician Attestation for Scribe:  Physician Attestation Statement for Scribe #1: I, Dr. Marlow, reviewed documentation, as scribed by Katie Butler in my presence, and it is both accurate and complete.         Attending ED Notes:   Emergent evaluation of 31-year-old male with past medical history of gastritis and intractable nausea and vomiting presents to the emergency department with chief complaint of abdominal pain, nausea and vomiting.  Patient is afebrile and tachycardic.  Patient is white blood cell count of 13,000.  H&H within normal limits.  BUN/creatinine are 44 3.7.  Calcium is 11.2.  Lipase is 78.  Chloride of 91.  Anion gap of 23.  Did review patient's medical record with most recent CT scan of abdomen performed a few weeks ago which was negative  for any acute findings.  Patient's abdominal exam is benign.  Despite treatment in emergency department patient has persistent nausea and vomiting.  The patient is extensively counseled on his diagnosis and treatment including all laboratory and physical exam findings.  The patient is admitted in stable condition.          ED Course     Clinical Impression:     1. Intractable vomiting with nausea, unspecified vomiting type    2. Generalized abdominal pain    3. Acute renal insufficiency    4. Hypercalcemia    5. Elevated lipase    6. Intractable cyclical vomiting with nausea                Nicholas Blake MD  01/22/17 0768

## 2017-01-22 NOTE — PROGRESS NOTES
[facility] Hospital Medicine  Progress Note    Patient Name: Rangel Tinoco  MRN: [id number]  Patient Class: OP- Observation   Admission Date: 1/21/2017  Length of Stay: 0 days  Attending Physician: Saba Mejia MD  Primary Care Provider: Primary Doctor No        Subjective:     Principal Problem:Intractable vomiting with nausea    HPI:  Mr. Tinoco is a 31 year old man who presented with 4 days of nausea and vomiting, diarrhea and abdominal pain with gas.  Evaluation in the ED showed him to have acute kidney injury with BUN/creatinine 44/3.7 with a baseline that seems to be at least CKD II, anion gap of 23 and calcium 11.2.  He was admitted for IV fluids and monitoring.    Patient's medical history is significant for recurrent episodes of explosive diarrhea associated with severe gas that always occur when he wakes up in the morning.  He does not have specific food issues but tries to avoid tomatoes and does not drink milk (he eats yogurt).  He has been seen in the ED with bowel issues on several occasions, most recently earlier this month, and a CT scan showed pancolitis.  Patient's brother has Crohn's disease.  He was referred to [facility] in follow up but the appointment is not until later next month and his problems have been worsening.         Hospital Course:       Interval History:  Feeling better after getting IV fluids for several hours and is tolerating clear liquids.  Gives further history of brother with Crohn's disease.    Review of Systems   Constitutional: Negative for chills and fever.   Respiratory: Negative for cough and shortness of breath.    Gastrointestinal: Positive for abdominal pain, diarrhea, nausea and vomiting.     Objective:     Vital Signs (Most Recent):  Temp: 97.8 °F (36.6 °C) (01/22/17 1538)  Pulse: 78 (01/22/17 1538)  Resp: 16 (01/22/17 1538)  BP: 123/69 (01/22/17 1538)  SpO2: 100 % (01/22/17 1538) Vital Signs (24h Range):  Temp:  [97.8 °F (36.6  °C)-99.7 °F (37.6 °C)] 97.8 °F (36.6 °C)  Pulse:  [] 78  Resp:  [16-18] 16  SpO2:  [97 %-100 %] 100 %  BP: (115-157)/(58-98) 123/69     Weight: 65.8 kg (145 lb)  Body mass index is 24.13 kg/(m^2).  No intake or output data in the 24 hours ending 01/22/17 7690   Physical Exam   Constitutional: He is oriented to person, place, and time. He appears well-developed and well-nourished. No distress.   HENT:   Head: Normocephalic and atraumatic.   Eyes: Conjunctivae are normal. Pupils are equal, round, and reactive to light.   Neck: Normal range of motion. Neck supple. No thyromegaly present.   Cardiovascular: Normal rate, regular rhythm and normal heart sounds.    Pulmonary/Chest: Effort normal and breath sounds normal.   Abdominal: Soft. He exhibits no distension.   Bowel sounds hyperactive.   Musculoskeletal: He exhibits no edema.   Neurological: He is alert and oriented to person, place, and time.   Skin: Skin is warm and dry.   Psychiatric: He has a normal mood and affect. His behavior is normal.       Significant Labs:   BMP:   Recent Labs  Lab 01/22/17  1418   GLU 89      K 4.0      CO2 25   BUN 41*   CREATININE 1.9*   CALCIUM 8.0*       Significant Imaging: I have reviewed all pertinent imaging results/findings within the past 24 hours.    Assessment/Plan:      * Intractable vomiting with nausea  - Resolving with fluids and antiemetics.  - Likely acute gastroenteritis, but has symptoms and family history worrisome for IBD.  - Given severity of symptoms with acute kidney injury and pancolitis seen on previous CT will consult GI.  - May need colonoscopy sooner rather than later.      Acute kidney injury superimposed on chronic kidney disease  - Baseline creatinine uncertain, had elevation to 2.7 during similar episode of vomiting and diarrhea in 2015.  - Improving with IV fluids - continue.      Generalized abdominal pain  - Possibly due to AGE, but suspect he has IBD.  - ESR not likely to add much  at this point given acute infection      Hypercalcemia  - Resolved with IV fluids.      VTE Risk Mitigation         Ordered     Place XIMENA hose  Until discontinued      01/22/17 0538          Saba Long MD  Department of Hospital Medicine   [facility]

## 2017-01-22 NOTE — ASSESSMENT & PLAN NOTE
- Resolving with fluids and antiemetics.  - Likely acute gastroenteritis, but has symptoms and family history worrisome for IBD.  - Given severity of symptoms with acute kidney injury and pancolitis seen on previous CT will consult GI.  - May need colonoscopy sooner rather than later.

## 2017-01-22 NOTE — PLAN OF CARE
Problem: Patient Care Overview  Goal: Plan of Care Review  Outcome: Ongoing (interventions implemented as appropriate)  Patient free of falls or injury during shift.  Pain well controlled with prn medication.  Positions self and ambulates independently.  Pt tolerated clear liquids this afternoon without difficulty.  Refuses teds; pt educated.  Bed low and locked, side rails x 2, call bell in reach.  Girlfriend at bedside.  Patient resting comfortably in bed, no other complaints at this time.  Will continue to monitor.

## 2017-01-22 NOTE — H&P
[facility] Hospital Medicine  History & Physical    Patient Name: Rangel Tinoco  MRN: [id number]  Admission Date: 1/21/2017  Attending Physician: Saba Mejia MD  Primary Care Provider: Primary Doctor No         Patient information was obtained from patient and ER records.     Subjective:     Principal Problem:Intractable vomiting with nausea    Chief Complaint:  Nausea/vomiting/diarrhea/abdominal pain     HPI: 31 yr old pleasant black male with gastritis, colitis, presents today to emergency with nausea, vomiting, diarrhea and generalized abdominal pain. Onset 4 days ago and gradually worsening. It is colicky and aching type, 9/10 in severity and aggravated by vomiting/diarrhea and no relieving factors. No blood in vomitus and diarrhea. He also reported subjective fever but no documentation. Denies any suspected food intake, sick contacts, recent travel. On presentation, he was in acute kidney injury with electrolyte abnormality and will be admitted for fluids and electrolyte replacement. He may also need GI consult if needed.    History reviewed. No pertinent past medical history.    Past Surgical History   Procedure Laterality Date    None         Review of patient's allergies indicates:  No Known Allergies    No current facility-administered medications on file prior to encounter.      Current Outpatient Prescriptions on File Prior to Encounter   Medication Sig    ondansetron (ZOFRAN-ODT) 4 MG TbDL Take 1 tablet (4 mg total) by mouth every 8 (eight) hours as needed.    oxycodone-acetaminophen (PERCOCET) 5-325 mg per tablet Take 1 tablet by mouth every 4 (four) hours as needed for Pain.    promethazine (PHENERGAN) 25 MG tablet Take 1 tablet (25 mg total) by mouth every 4 (four) hours.    ranitidine (ZANTAC) 25 mg effervescent tablet Take 25 mg by mouth 2 (two) times daily.    omeprazole (PRILOSEC) 40 MG capsule Take 40 mg by mouth once daily.    oxycodone-acetaminophen 5-325 mg  (PERCOCET) 5-325 mg per tablet Take 1 tablet by mouth every 4 (four) hours as needed for Pain.     Family History     None        Social History Main Topics    Smoking status: Never Smoker    Smokeless tobacco: Not on file    Alcohol use No    Drug use: No    Sexual activity: Not on file     Review of Systems   Constitutional: Negative.  Negative for activity change, chills, fatigue and unexpected weight change.   HENT: Negative.  Negative for congestion, drooling, ear pain, hearing loss, nosebleeds, rhinorrhea, sneezing, tinnitus and voice change.    Eyes: Negative.  Negative for discharge, redness, itching and visual disturbance.   Respiratory: Negative.  Negative for apnea, choking, shortness of breath and wheezing.    Cardiovascular: Negative.  Negative for chest pain and palpitations.   Gastrointestinal: Positive for abdominal pain, diarrhea, nausea and vomiting.   Endocrine: Negative.  Negative for cold intolerance, polydipsia and polyuria.   Genitourinary: Negative.  Negative for decreased urine volume, enuresis, frequency, hematuria, penile pain and testicular pain.   Musculoskeletal: Negative.  Negative for arthralgias, gait problem, myalgias and neck stiffness.   Skin: Negative.  Negative for color change, pallor and wound.   Allergic/Immunologic: Negative.  Negative for environmental allergies and immunocompromised state.   Neurological: Negative.  Negative for dizziness, seizures, syncope, weakness and headaches.   Hematological: Negative.  Negative for adenopathy. Does not bruise/bleed easily.   Psychiatric/Behavioral: Negative.  Negative for agitation, confusion, dysphoric mood, self-injury and suicidal ideas. The patient is not hyperactive.      Objective:     Vital Signs (Most Recent):  Temp: 97.9 °F (36.6 °C) (01/21/17 2310)  Pulse: 98 (01/22/17 0122)  Resp: 18 (01/21/17 2310)  BP: (!) 157/98 (01/22/17 0122)  SpO2: 99 % (01/22/17 0122) Vital Signs (24h Range):  Temp:  [97.9 °F (36.6 °C)] 97.9  °F (36.6 °C)  Pulse:  [] 98  Resp:  [18] 18  SpO2:  [99 %] 99 %  BP: (115-157)/(65-98) 157/98     Weight: 65.8 kg (145 lb)  Body mass index is 24.13 kg/(m^2).    Physical Exam   Constitutional: He is oriented to person, place, and time. He appears well-developed and well-nourished. No distress.   HENT:   Head: Normocephalic and atraumatic.   Right Ear: External ear normal.   Left Ear: External ear normal.   Nose: Nose normal.   Mouth/Throat: Oropharynx is clear and moist.   Eyes: Conjunctivae and EOM are normal. Pupils are equal, round, and reactive to light.   Neck: Normal range of motion. Neck supple. No JVD present. No tracheal deviation present. No thyromegaly present.   Cardiovascular: Normal rate, regular rhythm, normal heart sounds and intact distal pulses.  Exam reveals no gallop and no friction rub.    No murmur heard.  Pulmonary/Chest: Effort normal and breath sounds normal. No respiratory distress. He has no wheezes. He has no rales. He exhibits no tenderness.   Abdominal: Soft. Bowel sounds are normal. He exhibits no mass. There is tenderness. There is guarding. There is no rebound. No hernia.   Musculoskeletal: Normal range of motion. He exhibits no edema, tenderness or deformity.   Lymphadenopathy:     He has no cervical adenopathy.   Neurological: He is alert and oriented to person, place, and time. He has normal reflexes. He displays normal reflexes. No cranial nerve deficit. He exhibits normal muscle tone. Coordination normal.   Skin: Skin is warm and dry. No rash noted. He is not diaphoretic. No erythema. No pallor.   Psychiatric: He has a normal mood and affect. His behavior is normal. Judgment and thought content normal.       Significant Labs:   BMP:   Recent Labs  Lab 01/22/17  0007   *      K 3.9   CL 91*   CO2 24   BUN 44*   CREATININE 3.7*   CALCIUM 11.2*     CBC:   Recent Labs  Lab 01/22/17 0007   WBC 12.88*   HGB 18.0   HCT 49.1        CMP:   Recent Labs  Lab  01/22/17  0007      K 3.9   CL 91*   CO2 24   *   BUN 44*   CREATININE 3.7*   CALCIUM 11.2*   PROT 10.7*   ALBUMIN 5.3*   BILITOT 1.3*   ALKPHOS 104   AST 22   ALT 21   ANIONGAP 23*   EGFRNONAA 21*     Lipase:   Recent Labs  Lab 01/22/17  0007   LIPASE 78*     All pertinent labs within the past 24 hours have been reviewed.    Significant Imaging: I have reviewed and interpreted all pertinent imaging results/findings within the past 24 hours.    Assessment/Plan:     Active Diagnoses:    Diagnosis Date Noted POA    PRINCIPAL PROBLEM:  Intractable vomiting with nausea [R11.2] 01/22/2017 Yes    Acute renal insufficiency [N28.9] 01/22/2017 Yes    Elevated lipase [R74.8] 01/22/2017 Yes    Generalized abdominal pain [R10.84] 01/22/2017 Yes    Hypercalcemia [E83.52] 01/22/2017 Yes      Problems Resolved During this Admission:    Diagnosis Date Noted Date Resolved POA     VTE Risk Mitigation         Ordered     Place XIMENA hose  Until discontinued      01/22/17 0538        Admit to Hospital Medicine    1. Acute kidney injury  -IV fluids    2. Nausea/vomiting/diarrhea  -symptomatic medications  -IV fluids    3. Abdominal pain  -r/o colitis  -may need CT abdomen if pain is not improving    4. Prophylaxis  -XIMENA, PPI    Spent adequate time in obtaining history and explaining differentials    60 minutes spent during this visit of which greater than 50% devoted to face-face counseling and coordination of care regarding diagnosis and management plan    Robbin Olivera MD  Department of Hospital Medicine   [facility]

## 2017-01-22 NOTE — SUBJECTIVE & OBJECTIVE
Interval History:  Feeling better after getting IV fluids for several hours and is tolerating clear liquids.  Gives further history of brother with Crohn's disease.    Review of Systems   Constitutional: Negative for chills and fever.   Respiratory: Negative for cough and shortness of breath.    Gastrointestinal: Positive for abdominal pain, diarrhea, nausea and vomiting.     Objective:     Vital Signs (Most Recent):  Temp: 97.8 °F (36.6 °C) (01/22/17 1538)  Pulse: 78 (01/22/17 1538)  Resp: 16 (01/22/17 1538)  BP: 123/69 (01/22/17 1538)  SpO2: 100 % (01/22/17 1538) Vital Signs (24h Range):  Temp:  [97.8 °F (36.6 °C)-99.7 °F (37.6 °C)] 97.8 °F (36.6 °C)  Pulse:  [] 78  Resp:  [16-18] 16  SpO2:  [97 %-100 %] 100 %  BP: (115-157)/(58-98) 123/69     Weight: 65.8 kg (145 lb)  Body mass index is 24.13 kg/(m^2).  No intake or output data in the 24 hours ending 01/22/17 1719   Physical Exam   Constitutional: He is oriented to person, place, and time. He appears well-developed and well-nourished. No distress.   HENT:   Head: Normocephalic and atraumatic.   Eyes: Conjunctivae are normal. Pupils are equal, round, and reactive to light.   Neck: Normal range of motion. Neck supple. No thyromegaly present.   Cardiovascular: Normal rate, regular rhythm and normal heart sounds.    Pulmonary/Chest: Effort normal and breath sounds normal.   Abdominal: Soft. He exhibits no distension.   Bowel sounds hyperactive.   Musculoskeletal: He exhibits no edema.   Neurological: He is alert and oriented to person, place, and time.   Skin: Skin is warm and dry.   Psychiatric: He has a normal mood and affect. His behavior is normal.       Significant Labs:   BMP:   Recent Labs  Lab 01/22/17  1418   GLU 89      K 4.0      CO2 25   BUN 41*   CREATININE 1.9*   CALCIUM 8.0*       Significant Imaging: I have reviewed all pertinent imaging results/findings within the past 24 hours.

## 2017-01-22 NOTE — ASSESSMENT & PLAN NOTE
- Possibly due to AGE, but suspect he has IBD.  - ESR not likely to add much at this point given acute infection

## 2017-01-22 NOTE — ASSESSMENT & PLAN NOTE
- Baseline creatinine uncertain, had elevation to 2.7 during similar episode of vomiting and diarrhea in 2015.  - Improving with IV fluids - continue.

## 2017-01-23 LAB
ANION GAP SERPL CALC-SCNC: 5 MMOL/L
BUN SERPL-MCNC: 21 MG/DL
CALCIUM SERPL-MCNC: 7.9 MG/DL
CHLORIDE SERPL-SCNC: 109 MMOL/L
CO2 SERPL-SCNC: 24 MMOL/L
CREAT SERPL-MCNC: 1.1 MG/DL
EST. GFR  (AFRICAN AMERICAN): >60 ML/MIN/1.73 M^2
EST. GFR  (NON AFRICAN AMERICAN): >60 ML/MIN/1.73 M^2
GLUCOSE SERPL-MCNC: 78 MG/DL
MAGNESIUM SERPL-MCNC: 2.4 MG/DL
PHOSPHATE SERPL-MCNC: 2.8 MG/DL
POTASSIUM SERPL-SCNC: 4 MMOL/L
SODIUM SERPL-SCNC: 138 MMOL/L

## 2017-01-23 PROCEDURE — 63600175 PHARM REV CODE 636 W HCPCS: Performed by: INTERNAL MEDICINE

## 2017-01-23 PROCEDURE — 84100 ASSAY OF PHOSPHORUS: CPT

## 2017-01-23 PROCEDURE — 83735 ASSAY OF MAGNESIUM: CPT

## 2017-01-23 PROCEDURE — 99233 SBSQ HOSP IP/OBS HIGH 50: CPT | Mod: ,,, | Performed by: HOSPITALIST

## 2017-01-23 PROCEDURE — 25000003 PHARM REV CODE 250: Performed by: INTERNAL MEDICINE

## 2017-01-23 PROCEDURE — 97802 MEDICAL NUTRITION INDIV IN: CPT

## 2017-01-23 PROCEDURE — 11000001 HC ACUTE MED/SURG PRIVATE ROOM

## 2017-01-23 PROCEDURE — 25000003 PHARM REV CODE 250: Performed by: FAMILY MEDICINE

## 2017-01-23 PROCEDURE — 36415 COLL VENOUS BLD VENIPUNCTURE: CPT

## 2017-01-23 PROCEDURE — 80048 BASIC METABOLIC PNL TOTAL CA: CPT

## 2017-01-23 RX ORDER — POLYETHYLENE GLYCOL 3350, SODIUM SULFATE ANHYDROUS, SODIUM BICARBONATE, SODIUM CHLORIDE, POTASSIUM CHLORIDE 236; 22.74; 6.74; 5.86; 2.97 G/4L; G/4L; G/4L; G/4L; G/4L
4000 POWDER, FOR SOLUTION ORAL ONCE
Status: COMPLETED | OUTPATIENT
Start: 2017-01-23 | End: 2017-01-23

## 2017-01-23 RX ADMIN — SODIUM CHLORIDE: 0.9 INJECTION, SOLUTION INTRAVENOUS at 08:01

## 2017-01-23 RX ADMIN — MORPHINE SULFATE 2 MG: 2 INJECTION, SOLUTION INTRAMUSCULAR; INTRAVENOUS at 10:01

## 2017-01-23 RX ADMIN — POLYETHYLENE GLYCOL 3350, SODIUM SULFATE ANHYDROUS, SODIUM BICARBONATE, SODIUM CHLORIDE, POTASSIUM CHLORIDE 4000 ML: 236; 22.74; 6.74; 5.86; 2.97 POWDER, FOR SOLUTION ORAL at 06:01

## 2017-01-23 RX ADMIN — MORPHINE SULFATE 2 MG: 2 INJECTION, SOLUTION INTRAMUSCULAR; INTRAVENOUS at 04:01

## 2017-01-23 RX ADMIN — MORPHINE SULFATE 2 MG: 2 INJECTION, SOLUTION INTRAMUSCULAR; INTRAVENOUS at 03:01

## 2017-01-23 NOTE — PLAN OF CARE
Problem: Patient Care Overview  Goal: Plan of Care Review  Outcome: Ongoing (interventions implemented as appropriate)  Recommendations  1. When medically able advance diet to low fiber, low residue   2. Add Boost Breeze orange (a clear liquid supplement).   3. RD to monitor  Goals: PO intake >50% all meals  Nutrition Goal Status: new  Communication of RD Recs: reviewed with RN     Continuum of Care Plan  D/C planning: unable to determine at this time

## 2017-01-23 NOTE — SUBJECTIVE & OBJECTIVE
Interval History:  Tolerating clear liquids but has abdominal pain and gas.  Required morphine last night.    Review of Systems   Constitutional: Negative for chills and fever.   Gastrointestinal: Positive for abdominal distention and abdominal pain.     Objective:     Vital Signs (Most Recent):  Temp: 98.3 °F (36.8 °C) (01/23/17 0727)  Pulse: 66 (01/23/17 0727)  Resp: 16 (01/23/17 0727)  BP: (!) 116/55 (01/23/17 0727)  SpO2: 99 % (01/23/17 0727) Vital Signs (24h Range):  Temp:  [97.7 °F (36.5 °C)-98.3 °F (36.8 °C)] 98.3 °F (36.8 °C)  Pulse:  [66-78] 66  Resp:  [16] 16  SpO2:  [98 %-100 %] 99 %  BP: (104-128)/(55-76) 116/55     Weight: 65.8 kg (145 lb)  Body mass index is 24.13 kg/(m^2).    Intake/Output Summary (Last 24 hours) at 01/23/17 0933  Last data filed at 01/23/17 0600   Gross per 24 hour   Intake                0 ml   Output             1300 ml   Net            -1300 ml      Physical Exam   Constitutional: He is oriented to person, place, and time. He appears well-developed and well-nourished. No distress.   HENT:   Head: Normocephalic and atraumatic.   Eyes: Conjunctivae are normal. Pupils are equal, round, and reactive to light.   Neck: Normal range of motion. Neck supple. No thyromegaly present.   Cardiovascular: Normal rate, regular rhythm and normal heart sounds.    Pulmonary/Chest: Effort normal and breath sounds normal.   Abdominal: Soft. He exhibits distension.   Bowel sounds hyperactive.   Musculoskeletal: He exhibits no edema.   Neurological: He is alert and oriented to person, place, and time.   Skin: Skin is warm and dry.   Psychiatric: He has a normal mood and affect. His behavior is normal.       Significant Labs:   BMP:   Recent Labs  Lab 01/23/17  0501   GLU 78      K 4.0      CO2 24   BUN 21*   CREATININE 1.1   CALCIUM 7.9*   MG 2.4       Significant Imaging: I have reviewed all pertinent imaging results/findings within the past 24 hours.

## 2017-01-23 NOTE — PROGRESS NOTES
[facility]  Adult Nutrition  Consult Note    SUMMARY     Recommendations  1. When medically able advance diet to low fiber, low residue   2. Add Boost Breeze orange (a clear liquid supplement).   3. RD to monitor  Goals: PO intake >50% all meals  Nutrition Goal Status: new  Communication of RD Recs: reviewed with RN    Continuum of Care Plan  D/C planning: unable to determine at this time       Reason for Assessment  Reason for Assessment: identified at risk by screening criteria (unintentional weight loss)  Diagnosis:  (intractable vomiting with nausea)  Relevent Medical History: gastritis, colitis   Interdisciplinary Rounds: did not attend   Pt states appetite is improving. However he is having a colonoscopy tomorrow and will be NPO after midnight     Nutrition Prescription Ordered  Current Diet Order: Clear liquid     Nutrition Risk Screen   Nutrition Risk Screen: unintentional loss of 10 lbs or more in the past 2 mos    Nutrition/Diet History   Pt follows a general diet    Labs/Tests/Procedures/Meds  Pertinent Labs Reviewed: reviewed, pertinent  Pertinent Labs Comments: BUN 21, Cr 1.1  Pertinent Medications Reviewed: reviewed, pertinent  Pertinent Medications Comments: ondansetron    Physical Findings  General appearance: thin       Anthropometrics  Height (inches): 65 in  Weight Method: Stated  Weight (kg): 65.8 kg  Ideal Body Weight (IBW), Male: 136 lb  % Ideal Body Weight, Male (lb): 106.66 lb  BMI (kg/m2): 24.14  BMI Grade: 18.5-24.9 - normal  Usual Body Weight (UBW), k.8 kg  % Usual Body Weight: 100    Estimated/Assessed Needs  Weight Used For Calorie Calculations: 65.8 kg (145 lb 1 oz)   Height (cm): 165.1 cm  Energy Need Method: Frohna-St Jeor  Estimated energy needs: 1849kcal/day (x 1.2)  RMR (Frohna-St. Jeor Equation): 1541.7   Weight Used For Protein Calculations: 65.8 kg (145 lb 1 oz)   1.0 gm Protein (gm): 65.94  Estimated fluid needs: 1 ml per kcal or per MD       "    Malnutrition (Undernutrition) Diagnosis   Intake not documented; however pt drinking clear liquids at time of visit, stated everything was "staying down"      Nutrition Diagnosis  Nutrition Problem: Inadequate energy intake  Etiology/Related To: vomiting and nausea  Nutrition Diagnosis Signs/Symptoms As Evidenced By: clear liquid diet, as reported by pt  Nutrition Diagnosis Status: New    Monitor and Evaluation  Food and Nutrient Intake: energy intake, food and beverage intake  Food and Nutrient Adminstration: diet order  Anthropometric Measurements: weight, weight change  Biochemical Data, Medical Tests and Procedures: electrolyte and renal panel, gastrointestinal profile  Nutrition-Focused Physical Findings: overall appearance    Nutrition Risk    Level of Risk:  (follow twice weekly)    Nutrition Follow-Up    RD Follow-up?: Yes  "

## 2017-01-23 NOTE — PLAN OF CARE
"Problem: Patient Care Overview  Goal: Plan of Care Review  Outcome: Ongoing (interventions implemented as appropriate)  Patient free of falls or injury during shift. Positions self and ambulates independently. Pt tolerated clear liquids this morning, but since then has had three episodes of diarrhea and started c/o increased "stomach cramping."  Pain well controlled with prn medication. Refuses teds; pt educated. Bed low and locked, side rails x 2, call bell in reach.  Patient resting comfortably in bed, no other complaints at this time. Will continue to monitor.      "

## 2017-01-23 NOTE — CONSULTS
[facility]  Gastroenterology  Consult Note    Patient Name: Rangel Tinoco  MRN: [id number]  Admission Date: 1/21/2017  Hospital Length of Stay: 1 days  Code Status: Full Code   Attending Provider:   Consulting Provider: Farhad Bolaños MD  Primary Care Physician: Primary Doctor No  Principal Problem:Intractable vomiting with nausea    Last Recorded LACE+ Scores     None          Consults  Subjective:      32 y/o man c/o 4 years of intermittent diarrhea with hematochezia. This is associated with crampy abdominal pain and occasional N/V.  He came to the ED 3 weeks ago because of severe sx. CT showed pancolitis.  He had an appointment to see GI at [facility], but the sx became severe again so he came here.  There has been some weight loss over the last few weeks. His brother has Crohn's.    History reviewed. No pertinent past medical history.    Past Surgical History   Procedure Laterality Date    None         Review of patient's allergies indicates:  No Known Allergies  Family History     Problem Relation (Age of Onset)    Crohn's disease Brother    Kidney failure Mother        Social History Main Topics    Smoking status: Never Smoker    Smokeless tobacco: Never Used    Alcohol use No    Drug use: No    Sexual activity: Not on file     Review of Systems   Constitutional: Positive for unexpected weight change. Negative for chills and fever.   HENT: Negative.    Eyes: Negative.    Respiratory: Negative.    Cardiovascular: Negative.    Gastrointestinal: Positive for abdominal pain, anal bleeding, blood in stool, diarrhea, nausea and vomiting. Negative for abdominal distention and constipation.   Musculoskeletal: Negative for arthralgias and myalgias.     Objective:     Vital Signs (Most Recent):  Temp: 98.3 °F (36.8 °C) (01/23/17 0727)  Pulse: 66 (01/23/17 0727)  Resp: 16 (01/23/17 0727)  BP: (!) 116/55 (01/23/17 0727)  SpO2: 99 % (01/23/17 0727) Vital Signs (24h Range):  Temp:  [97.7 °F (36.5  °C)-98.3 °F (36.8 °C)] 98.3 °F (36.8 °C)  Pulse:  [66-78] 66  Resp:  [16] 16  SpO2:  [98 %-100 %] 99 %  BP: (104-128)/(55-76) 116/55     Weight: 65.8 kg (145 lb) (01/21/17 2310)  Body mass index is 24.13 kg/(m^2).      Intake/Output Summary (Last 24 hours) at 01/23/17 0945  Last data filed at 01/23/17 0600   Gross per 24 hour   Intake                0 ml   Output             1300 ml   Net            -1300 ml       Lines/Drains/Airways     Peripheral Intravenous Line                 Peripheral IV - Single Lumen 01/03/17 1812 Right Forearm 19 days         Peripheral IV - Single Lumen 01/22/17 Left Forearm 1 day                Physical Exam   Constitutional: He is oriented to person, place, and time. He appears well-developed and well-nourished.   HENT:   Head: Normocephalic and atraumatic.   Eyes: Conjunctivae are normal.   Cardiovascular: Normal rate, regular rhythm and normal heart sounds.    Pulmonary/Chest: Effort normal and breath sounds normal.   Abdominal: Soft. Bowel sounds are normal. There is tenderness. There is no rebound and no guarding.   Neurological: He is alert and oriented to person, place, and time.   Psychiatric: He has a normal mood and affect. His behavior is normal.       Significant Labs:  CBC:   Recent Labs  Lab 01/22/17  0007   WBC 12.88*   HGB 18.0   HCT 49.1        CMP:   Recent Labs  Lab 01/22/17  0007  01/23/17  0501   *  < > 78   CALCIUM 11.2*  < > 7.9*   ALBUMIN 5.3*  --   --    PROT 10.7*  --   --      < > 138   K 3.9  < > 4.0   CO2 24  < > 24   CL 91*  < > 109   BUN 44*  < > 21*   CREATININE 3.7*  < > 1.1   ALKPHOS 104  --   --    ALT 21  --   --    AST 22  --   --    BILITOT 1.3*  --   --    < > = values in this interval not displayed.    Significant Imaging:      Assessment/Plan:     Active Diagnoses:    Diagnosis Date Noted POA    PRINCIPAL PROBLEM:  Intractable vomiting with nausea [R11.2] 01/22/2017 Yes    Acute kidney injury superimposed on chronic kidney  disease [N17.9, N18.9] 01/22/2017 Yes    Generalized abdominal pain [R10.84] 01/22/2017 Yes    Pancolitis [K51.00] 01/22/2017 Yes    Severe dehydration [E86.0] 01/22/2017 Yes      Problems Resolved During this Admission:    Diagnosis Date Noted Date Resolved POA    Hypercalcemia [E83.52] 01/22/2017 01/22/2017 Yes       VTE Risk Mitigation         Ordered     Place XIMENA hose  Until discontinued      01/22/17 0538        Intermittent episodes of diarrhea with hematochezia.  Pancolitis on CT 3 weeks ago.  His brother has Crohn's.    Plan colonoscopy tomorrow.  Procedure discussed with patient who agrees.      Thank you for your consult.     Farhad Bolaños MD  Gastroenterology  [facility]

## 2017-01-23 NOTE — PROGRESS NOTES
Patient c/o pain to abdomen.  PRN pain medication order .  [employer] contacted.  V/O to renew pre-existing order for Morphine 2 mg q6 hours.  Read back order and verified.  Will continue to monitor.

## 2017-01-23 NOTE — ASSESSMENT & PLAN NOTE
- Resolving with fluids and antiemetics.  - Likely had an episode of acute gastroenteritis, but also has symptoms and family history worrisome for IBD.  - Given severity of symptoms with acute kidney injury and pancolitis seen on previous CT GI was consulted  - Plan for colonoscopy tomorrow.

## 2017-01-23 NOTE — ASSESSMENT & PLAN NOTE
- Baseline creatinine uncertain, had elevation to 2.7 during similar episode of vomiting and diarrhea in 2015.  - Possible mild CKD at baseline.  - Resolved with IV fluids which may be discontinued today.

## 2017-01-23 NOTE — PROGRESS NOTES
[facility] Hospital Medicine  Progress Note    Patient Name: Rangel Tinoco  MRN: [id number]  Patient Class: IP- Inpatient   Admission Date: 1/21/2017  Length of Stay: 1 days  Attending Physician: Saba Mejia MD  Primary Care Provider: Primary Doctor No        Subjective:     Principal Problem:Intractable vomiting with nausea    HPI:  Mr. Tinoco is a 31 year old man who presented with 4 days of nausea and vomiting, diarrhea and abdominal pain with gas.  Evaluation in the ED showed him to have acute kidney injury with BUN/creatinine 44/3.7 with a baseline that seems to be at least CKD II, anion gap of 23 and calcium 11.2.  He was admitted for IV fluids and monitoring.    Patient's medical history is significant for recurrent episodes of explosive diarrhea associated with severe gas that always occur when he wakes up in the morning.  He does not have specific food issues but tries to avoid tomatoes and does not drink milk (he eats yogurt).  He has been seen in the ED with bowel issues on several occasions, most recently earlier this month, and a CT scan showed pancolitis.  Patient's brother has Crohn's disease.  He was referred to [facility] in follow up but the appointment is not until later next month and his problems have been worsening.         Hospital Course:  Patient continued to have abdominal pain and gaseous distention, but was able to tolerate clear liquids.  With IV fluids his renal function normalized.  He was seen by gastroenterology and scheduled for a colonoscopy.    Interval History:  Tolerating clear liquids but has abdominal pain and gas.  Required morphine last night.    Review of Systems   Constitutional: Negative for chills and fever.   Gastrointestinal: Positive for abdominal distention and abdominal pain.     Objective:     Vital Signs (Most Recent):  Temp: 98.3 °F (36.8 °C) (01/23/17 0727)  Pulse: 66 (01/23/17 0727)  Resp: 16 (01/23/17 0727)  BP: (!) 116/55  (01/23/17 0727)  SpO2: 99 % (01/23/17 0727) Vital Signs (24h Range):  Temp:  [97.7 °F (36.5 °C)-98.3 °F (36.8 °C)] 98.3 °F (36.8 °C)  Pulse:  [66-78] 66  Resp:  [16] 16  SpO2:  [98 %-100 %] 99 %  BP: (104-128)/(55-76) 116/55     Weight: 65.8 kg (145 lb)  Body mass index is 24.13 kg/(m^2).    Intake/Output Summary (Last 24 hours) at 01/23/17 0933  Last data filed at 01/23/17 0600   Gross per 24 hour   Intake                0 ml   Output             1300 ml   Net            -1300 ml      Physical Exam   Constitutional: He is oriented to person, place, and time. He appears well-developed and well-nourished. No distress.   HENT:   Head: Normocephalic and atraumatic.   Eyes: Conjunctivae are normal. Pupils are equal, round, and reactive to light.   Neck: Normal range of motion. Neck supple. No thyromegaly present.   Cardiovascular: Normal rate, regular rhythm and normal heart sounds.    Pulmonary/Chest: Effort normal and breath sounds normal.   Abdominal: Soft. He exhibits distension.   Bowel sounds hyperactive.   Musculoskeletal: He exhibits no edema.   Neurological: He is alert and oriented to person, place, and time.   Skin: Skin is warm and dry.   Psychiatric: He has a normal mood and affect. His behavior is normal.       Significant Labs:   BMP:   Recent Labs  Lab 01/23/17  0501   GLU 78      K 4.0      CO2 24   BUN 21*   CREATININE 1.1   CALCIUM 7.9*   MG 2.4       Significant Imaging: I have reviewed all pertinent imaging results/findings within the past 24 hours.    Assessment/Plan:      * Intractable vomiting with nausea  - Resolving with fluids and antiemetics.  - Likely had an episode of acute gastroenteritis, but also has symptoms and family history worrisome for IBD.  - Given severity of symptoms with acute kidney injury and pancolitis seen on previous CT GI was consulted  - Plan for colonoscopy tomorrow.    Acute kidney injury superimposed on chronic kidney disease  - Baseline creatinine  uncertain, had elevation to 2.7 during similar episode of vomiting and diarrhea in 2015.  - Possible mild CKD at baseline.  - Resolved with IV fluids which may be discontinued today.      Generalized abdominal pain  - Possibly due to AGE, but suspect he has IBD.  - ESR not likely to add much at this point given acute infection      Pancolitis  - For colonoscopy tomorrow.  - Likely IBD given symptoms and family history.      Severe dehydration  - Resolved with IV fluid replacement.      VTE Risk Mitigation         Ordered     Place XIMENA hose  Until discontinued      01/22/17 0538          Saba Long MD  Department of Hospital Medicine   [facility]

## 2017-01-24 ENCOUNTER — SURGERY (OUTPATIENT)
Age: 32
End: 2017-01-24

## 2017-01-24 ENCOUNTER — ANESTHESIA (OUTPATIENT)
Dept: ENDOSCOPY | Facility: OTHER | Age: 32
DRG: 394 | End: 2017-01-24
Payer: MEDICAID

## 2017-01-24 ENCOUNTER — ANESTHESIA EVENT (OUTPATIENT)
Dept: ENDOSCOPY | Facility: OTHER | Age: 32
DRG: 394 | End: 2017-01-24
Payer: MEDICAID

## 2017-01-24 VITALS
OXYGEN SATURATION: 100 % | RESPIRATION RATE: 16 BRPM | WEIGHT: 145 LBS | HEART RATE: 75 BPM | TEMPERATURE: 99 F | HEIGHT: 65 IN | BODY MASS INDEX: 24.16 KG/M2 | DIASTOLIC BLOOD PRESSURE: 81 MMHG | SYSTOLIC BLOOD PRESSURE: 142 MMHG

## 2017-01-24 LAB
ANION GAP SERPL CALC-SCNC: 12 MMOL/L
BUN SERPL-MCNC: 8 MG/DL
CALCIUM SERPL-MCNC: 8.1 MG/DL
CHLORIDE SERPL-SCNC: 110 MMOL/L
CO2 SERPL-SCNC: 17 MMOL/L
CREAT SERPL-MCNC: 0.9 MG/DL
EST. GFR  (AFRICAN AMERICAN): >60 ML/MIN/1.73 M^2
EST. GFR  (NON AFRICAN AMERICAN): >60 ML/MIN/1.73 M^2
GLUCOSE SERPL-MCNC: 88 MG/DL
POTASSIUM SERPL-SCNC: 3.8 MMOL/L
SODIUM SERPL-SCNC: 139 MMOL/L

## 2017-01-24 PROCEDURE — 37000008 HC ANESTHESIA 1ST 15 MINUTES: Performed by: INTERNAL MEDICINE

## 2017-01-24 PROCEDURE — 63600175 PHARM REV CODE 636 W HCPCS: Performed by: NURSE ANESTHETIST, CERTIFIED REGISTERED

## 2017-01-24 PROCEDURE — 80048 BASIC METABOLIC PNL TOTAL CA: CPT

## 2017-01-24 PROCEDURE — 37000009 HC ANESTHESIA EA ADD 15 MINS: Performed by: INTERNAL MEDICINE

## 2017-01-24 PROCEDURE — 27201089 HC SNARE, DISP (ANY): Performed by: INTERNAL MEDICINE

## 2017-01-24 PROCEDURE — 0DBL8ZX EXCISION OF TRANSVERSE COLON, VIA NATURAL OR ARTIFICIAL OPENING ENDOSCOPIC, DIAGNOSTIC: ICD-10-PCS | Performed by: INTERNAL MEDICINE

## 2017-01-24 PROCEDURE — 25000003 PHARM REV CODE 250: Performed by: NURSE ANESTHETIST, CERTIFIED REGISTERED

## 2017-01-24 PROCEDURE — 27201012 HC FORCEPS, HOT/COLD, DISP: Performed by: INTERNAL MEDICINE

## 2017-01-24 PROCEDURE — 63600175 PHARM REV CODE 636 W HCPCS: Performed by: INTERNAL MEDICINE

## 2017-01-24 PROCEDURE — 45380 COLONOSCOPY AND BIOPSY: CPT | Performed by: INTERNAL MEDICINE

## 2017-01-24 PROCEDURE — 45385 COLONOSCOPY W/LESION REMOVAL: CPT | Performed by: INTERNAL MEDICINE

## 2017-01-24 PROCEDURE — 36415 COLL VENOUS BLD VENIPUNCTURE: CPT

## 2017-01-24 PROCEDURE — 88305 TISSUE EXAM BY PATHOLOGIST: CPT | Performed by: PATHOLOGY

## 2017-01-24 PROCEDURE — 0DBP8ZX EXCISION OF RECTUM, VIA NATURAL OR ARTIFICIAL OPENING ENDOSCOPIC, DIAGNOSTIC: ICD-10-PCS | Performed by: INTERNAL MEDICINE

## 2017-01-24 PROCEDURE — 88305 TISSUE EXAM BY PATHOLOGIST: CPT | Mod: 26,,, | Performed by: PATHOLOGY

## 2017-01-24 PROCEDURE — 99239 HOSP IP/OBS DSCHRG MGMT >30: CPT | Mod: ,,, | Performed by: INTERNAL MEDICINE

## 2017-01-24 PROCEDURE — 0DBN8ZX EXCISION OF SIGMOID COLON, VIA NATURAL OR ARTIFICIAL OPENING ENDOSCOPIC, DIAGNOSTIC: ICD-10-PCS | Performed by: INTERNAL MEDICINE

## 2017-01-24 RX ORDER — MESALAMINE 500 MG/1
1000 CAPSULE, EXTENDED RELEASE ORAL 3 TIMES DAILY
Qty: 540 CAPSULE | Refills: 1 | Status: SHIPPED | OUTPATIENT
Start: 2017-01-24 | End: 2018-01-24

## 2017-01-24 RX ORDER — LIDOCAINE HCL/PF 100 MG/5ML
SYRINGE (ML) INTRAVENOUS
Status: DISCONTINUED | OUTPATIENT
Start: 2017-01-24 | End: 2017-01-24

## 2017-01-24 RX ORDER — PROPOFOL 10 MG/ML
VIAL (ML) INTRAVENOUS
Status: DISCONTINUED | OUTPATIENT
Start: 2017-01-24 | End: 2017-01-24

## 2017-01-24 RX ADMIN — PROPOFOL 100 MG: 10 INJECTION, EMULSION INTRAVENOUS at 07:01

## 2017-01-24 RX ADMIN — MORPHINE SULFATE 2 MG: 2 INJECTION, SOLUTION INTRAMUSCULAR; INTRAVENOUS at 03:01

## 2017-01-24 RX ADMIN — MORPHINE SULFATE 2 MG: 2 INJECTION, SOLUTION INTRAMUSCULAR; INTRAVENOUS at 09:01

## 2017-01-24 RX ADMIN — PROPOFOL 100 MG: 10 INJECTION, EMULSION INTRAVENOUS at 08:01

## 2017-01-24 RX ADMIN — LIDOCAINE HYDROCHLORIDE 50 MG: 20 INJECTION, SOLUTION INTRAVENOUS at 07:01

## 2017-01-24 NOTE — NURSING
Patient tolerated diet.  Patient educated on discharge instructions and verbalized understanding.  All questions answered to patient's satisfaction.  IV removed without complication.  Girlfriend at bedside.  Patient refused transport.

## 2017-01-24 NOTE — ANESTHESIA POSTPROCEDURE EVALUATION
"Anesthesia Post Evaluation    Patient: Rangel Tinoco    Procedure(s) Performed: Procedure(s) (LRB):  COLONOSCOPY (N/A)  POLYPECTOMY (N/A)    Final Anesthesia Type: general  Patient location during evaluation: PACU  Patient participation: Yes- Able to Participate  Level of consciousness: oriented and awake  Post-procedure vital signs: reviewed and stable  Pain management: adequate  Airway patency: patent  PONV status at discharge: No PONV  Anesthetic complications: no      Cardiovascular status: hemodynamically stable  Respiratory status: unassisted, spontaneous ventilation and room air  Hydration status: euvolemic  Follow-up not needed.        Visit Vitals    /77 (BP Location: Right arm, Patient Position: Lying, BP Method: Automatic)    Pulse 65    Temp 36.8 °C (98.2 °F) (Oral)    Resp 16    Ht 5' 5" (1.651 m)    Wt 65.8 kg (145 lb)    SpO2 100%    BMI 24.13 kg/m2       Pain/Hussain Score: Pain Assessment Performed: Yes (1/24/2017  9:05 AM)  Presence of Pain: denies (1/24/2017  9:05 AM)  Pain Rating Prior to Med Admin: 9 (1/24/2017  9:20 AM)  Pain Rating Post Med Admin: 4 (1/23/2017  4:28 PM)  Hussain Score: 10 (1/24/2017  8:15 AM)      "

## 2017-01-24 NOTE — TRANSFER OF CARE
"Anesthesia Transfer of Care Note    Patient: Rangel Tinoco    Procedure(s) Performed: Procedure(s) (LRB):  COLONOSCOPY (N/A)  POLYPECTOMY (N/A)    Patient location: PACU    Anesthesia Type: general    Transport from OR: Transported from OR on 2-3 L/min O2 by NC with adequate spontaneous ventilation    Post pain: adequate analgesia    Post assessment: no apparent anesthetic complications    Post vital signs: stable    Level of consciousness: awake, alert and oriented    Complications: none          Last vitals:   Visit Vitals    /74 (BP Location: Right arm, Patient Position: Lying, BP Method: Automatic)    Pulse 74    Temp 36.8 °C (98.2 °F) (Oral)    Resp 16    Ht 5' 5" (1.651 m)    Wt 65.8 kg (145 lb)    SpO2 100%    BMI 24.13 kg/m2     "

## 2017-01-24 NOTE — ANESTHESIA PREPROCEDURE EVALUATION
01/24/2017  Rangel Tinoco is a 31 y.o., male.    [facility] Anesthesia Evaluation    I have reviewed the Patient Summary Reports.    I have reviewed the Nursing Notes.   I have reviewed the Medications.     Review of Systems  Anesthesia Hx:  No previous Anesthesia  Denies Family Hx of Anesthesia complications.    Social:  Alcohol Use, Non-Smoker    Hematology/Oncology:  Hematology Normal   Oncology Normal     EENT/Dental:EENT/Dental Normal   Cardiovascular:  Cardiovascular Normal Exercise tolerance: good  Very active   Pulmonary:  Pulmonary Normal    Renal/:   Chronic Renal Disease    Hepatic/GI:   PUD,    Musculoskeletal:  Musculoskeletal Normal    Neurological:  Neurology Normal    Endocrine:  Endocrine Normal    Dermatological:  Skin Normal    Psych:  Psychiatric Normal           Physical Exam  General:  Well nourished    Airway/Jaw/Neck:  Airway Findings: Mouth Opening: Normal Tongue: Normal  General Airway Assessment: Adult, Good  Mallampati: I  TM Distance: Normal, at least 6 cm  Jaw/Neck Findings:  Neck ROM: Normal ROM      Dental:  Dental Findings: In tact        Mental Status:  Mental Status Findings:  Cooperative, Alert and Oriented         Anesthesia Plan  Type of Anesthesia, risks & benefits discussed:  Anesthesia Type:  general  Patient's Preference:   Intra-op Monitoring Plan:   Intra-op Monitoring Plan Comments:   Post Op Pain Control Plan:   Post Op Pain Control Plan Comments:   Induction:    Beta Blocker:         Informed Consent: Patient understands risks and agrees with Anesthesia plan.  Questions answered. Anesthesia consent signed with patient.  ASA Score: 2     Day of Surgery Review of History & Physical:    H&P update referred to the provider.         Ready For Surgery From Anesthesia Perspective.

## 2017-01-24 NOTE — PLAN OF CARE
01/24/17 1455   Final Note   Assessment Type Final Discharge Note   Discharge Disposition Home   Discharge planning education complete? Yes   Hospital Follow Up  Appt(s) scheduled? Yes   Discharge plans and expectations educations in teach back method with documentation complete? Yes   Offered [facility] Bed[facility]? n/a   Discharge/Hospital Encounter Summary to ([facility]) PCP n/a   Referral to Outpatient Case Management complete? n/a   Referral to / orders for Home Health Complete? n/a   30 day supply of medicines given at discharge, if documented non-compliance / non-adherence? n/a   Any social issues identified prior to discharge? n/a   Did you assess the readiness or willingness of the family or caregiver to support self management of care? Yes   Right Care Referral Info   Post Acute Recommendation Home-care

## 2017-01-24 NOTE — DISCHARGE SUMMARY
Discharge Summary  Hospital Medicine      Admit Date: 1/21/2017    Discharge Date and Time: 1/24/2017 1:56 PM    Discharge Attending Physician: Brian Shetty MD     Diagnoses:  Active Hospital Problems    Diagnosis  POA    *Intractable vomiting with nausea [R11.2]  Yes    Acute kidney injury superimposed on chronic kidney disease [N17.9, N18.9]  Yes    Generalized abdominal pain [R10.84]  Yes    Pancolitis [K51.00]  Yes    Severe dehydration [E86.0]  Yes      Resolved Hospital Problems    Diagnosis Date Resolved POA    Hypercalcemia [E83.52] 01/22/2017 Yes       Discharged Condition: stable    Hospital Course: Mr. Tinoco is a 31 year old man who presented with 4 days of nausea and vomiting, diarrhea and abdominal pain with gas. Evaluation in the ED showed him to have acute kidney injury with BUN/creatinine 44/3.7 with a baseline that seems to be at least CKD II, anion gap of 23 and calcium 11.2. He was admitted for IV fluids and monitoring.  He has been seen in the ED with bowel issues on several occasions, most recently earlier this month, and a CT scan showed pancolitis. Patient's brother has Crohn's disease.     Intermittent abdominal pain  - underwent Colonoscopy 1/23  - 1.5 cm polyp in the sigmoid colon with ulceration and stigmata of bleeding.   - Abnormal mucosa in the distal sigmoid/ rectosigmoid junction. Hyperemic appearance with submucosal hemorrhage. Bx obtained  - 5 mm sessile polyp in the splenic flexure  - remaining colon and TI with normal mucosa    Will be started on empiric Mesalamine until follow up  Will follow with Dr. Jordan and needs to [facility]      Acute kidney injury superimposed on chronic kidney disease  - mostly resolved  - avoid any NSAIDs     Pancolitis  - no correlate on Colonoscopy.     Consults: Gastroenterology    Significant Diagnostic Studies:   CBC:   Recent Labs  Lab 01/22/17  0007   WBC 12.88*   RBC 5.44   HGB 18.0   HCT  49.1      MCV 90   MCH 33.1*   MCHC 36.7*       CMP:   Recent Labs  Lab 01/22/17  0007  01/24/17  0510   *  < > 88   CALCIUM 11.2*  < > 8.1*   ALBUMIN 5.3*  --   --    PROT 10.7*  --   --      < > 139   K 3.9  < > 3.8   CO2 24  < > 17*   CL 91*  < > 110   BUN 44*  < > 8   CREATININE 3.7*  < > 0.9   ALKPHOS 104  --   --    ALT 21  --   --    AST 22  --   --    BILITOT 1.3*  --   --    < > = values in this interval not displayed.    Special Treatments/Procedures: Colonoscopy:  1) 1.5 cm polyp in the sigmoid colon with ulceration and stigmata of bleeding     2) Abnormal mucosa in the distal sigmoid/ rectosigmoid junction. Hyperemic appearance with submucosal hemorrhage. Bx obtained     3) 5 mm sessile polyp in the splenic flexure     4) small internal hemorrhoids     5) remaining colon and TI with normal mucosa         Disposition: Home or Self Care    Diet: low residue    Activity: as tolerated    Patient Instructions:   Reconciled Home Medications:   Current Discharge Medication List      START taking these medications    Details   mesalamine (PENTASA) 500 MG CR capsule Take 2 capsules (1,000 mg total) by mouth 3 (three) times daily.  Qty: 540 capsule, Refills: 1         CONTINUE these medications which have NOT CHANGED    Details   ondansetron (ZOFRAN-ODT) 4 MG TbDL Take 1 tablet (4 mg total) by mouth every 8 (eight) hours as needed.  Qty: 12 tablet, Refills: 0      oxycodone-acetaminophen (PERCOCET) 5-325 mg per tablet Take 1 tablet by mouth every 4 (four) hours as needed for Pain.  Qty: 10 tablet, Refills: 0      promethazine (PHENERGAN) 25 MG tablet Take 1 tablet (25 mg total) by mouth every 4 (four) hours.  Qty: 20 tablet, Refills: 0      ranitidine (ZANTAC) 25 mg effervescent tablet Take 25 mg by mouth 2 (two) times daily.      omeprazole (PRILOSEC) 40 MG capsule Take 40 mg by mouth once daily.               Discharge Procedure Orders  Diet general     Activity as tolerated     Call MD for:   temperature >100.4     Call MD for:  severe uncontrolled pain     Call MD for:  difficulty breathing or increased cough     Call MD for:  severe persistent headache     Call MD for:  increased confusion or weakness     No dressing needed         Follow-up Information     [facility].    Why:  See new PCP in 1 week    Contact information:    [address] [facility] [id number] [phone]          Follow up with Beryl Jordan MD In 2 weeks.    Specialty:  Gastroenterology    Contact information:    [address]  [address] [id number] [phone]

## 2017-01-24 NOTE — PROGRESS NOTES
Colonoscopy with polypectomy and biopsy  1) 1.5 cm polyp in the sigmoid colon with ulceration and stigmata of bleeding    2) Abnormal mucosa in the distal sigmoid/ rectosigmoid junction. Hyperemic appearance with submucosal hemorrhage. Bx obtained    3) 5 mm sessile polyp in the splenic flexure    4) small internal hemorrhoids    5) remaining colon and TI with normal mucosa    No explanation for pancolitis on CT 3 weeks ago. Polyp size and location could predispose to intussusception and  Colonic ischemia, but it would be more localized in the left colon.     Rec follow up path and genetic screening.  Follow up in the office in 2 weeks

## 2017-01-26 ENCOUNTER — PATIENT OUTREACH (OUTPATIENT)
Dept: ADMINISTRATIVE | Facility: CLINIC | Age: 32
End: 2017-01-26
Payer: MEDICAID

## 2017-01-26 NOTE — PROGRESS NOTES
Please forward this important TCC information to your provider in order to maximize the post discharge care delivery of this patient.    C3 nurse spoke with Rangel Tinoco for a TCC post hospital discharge follow up call. The patient has a scheduled HOSFU appointment with Saira ROBERTS [facility] on 2/2/17 @ 09:00 hrs. & with Dr. Jordan same date @ 14:30 hrs.    Respectfully,  Estrella Cowan RN, CDE  [facility]    [email or web address]       Please do not reply to this message, as this inbox is not routinely monitored.

## 2017-01-31 NOTE — PHYSICIAN QUERY
PT Name: Rangel Tinoco  MR #: [id number]    Physician Query Form - Pathology Findings Clarification     Reviewer  Ext  Ladan  [phone]   This form is a permanent document in the medical record.     Query Date: January 31, 2017    Dear Provider,   By submitting this query, we are merely seeking further clarification of documentation.  Please utilize your independent clinical judgment when addressing the question(s) below.      The Medical record reflects the following:     Findings Supporting Clinical Information Location in Medical Record   Acute Colitis w/Hemorrhage MILD ACUTE COLITIS WITH HEMORRHAGE. Path Report       Please document the clinical significance of the Pathologist's findings of Acute Colitis w/Hemorrhage.                 [  x ]   I agree with the Pathology Findings               [   ]   I do not agree with the Pathology Findings               [   ]   Clinically insignificant               [   ]   Clinically undetermined               [   ]   Other/Clarification of findings: _________________________________________________

## 2021-10-29 NOTE — PLAN OF CARE
"D/C Planning:    Chart reviewed today    Writer met with patient at bedside to discuss plan of care. Patient confirmed identifying information. Patient states he lives with his significant other, Mariam Chapito [phone]. Patient does not use DME at home, and is not prescribed medication. 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 Patient has not seen a PCP since enrolling in [place] Medicaid. Patient does not attend HD at this time. Patient has never used HH services before as he has not had the need.     Patient does not have any D/C concerns at this time.     CM to follow and remain supportive.        01/23/17 1015   Discharge Assessment   Assessment Type Discharge Planning Assessment   Confirmed/corrected address and phone number on facesheet? Yes   Assessment information obtained from? Patient   Prior to hospitilization cognitive status: Alert/Oriented   Prior to hospitalization functional status: Independent   Current cognitive status: Alert/Oriented   Current Functional Status: Independent   Arrived From admitted as an inpatient   Lives With significant other   Able to Return to Prior Arrangements yes   Is patient able to care for self after discharge? Yes   How many people do you have in your home that can help with your care after discharge? 1   Who are your caregiver(s) and their phone number(s)? Mariam Uriarte, S/O, [phone]   Patient currently being followed by outpatient case management? No   Patient currently receives home health services? No   Does the patient currently use HME? No   Patient currently receives private duty nursing? No   Patient currently receives any other outside agency services? No   Equipment Currently Used at Home none   Do you have any problems affording any of your prescribed medications? No   Is the " patient taking medications as prescribed? no   Do you have any financial concerns preventing you from receiving the healthcare you need? No   Does the patient have transportation to healthcare appointments? Yes   Transportation Available car;family or friend will provide   On Dialysis? No   Does the patient receive services at the Coumadin Clinic? No   Are there any open cases? No   Discharge Plan A Home   Patient/Family In Agreement With Plan yes      Orientation to room/Bed in low position, brakes on/Call light is within reach, educate patient/family on its functionality